# Patient Record
Sex: FEMALE | Race: WHITE | Employment: OTHER | URBAN - METROPOLITAN AREA
[De-identification: names, ages, dates, MRNs, and addresses within clinical notes are randomized per-mention and may not be internally consistent; named-entity substitution may affect disease eponyms.]

---

## 2017-01-12 ENCOUNTER — OFFICE VISIT (OUTPATIENT)
Dept: FAMILY MEDICINE CLINIC | Age: 82
End: 2017-01-12

## 2017-01-12 VITALS
DIASTOLIC BLOOD PRESSURE: 78 MMHG | HEIGHT: 66 IN | TEMPERATURE: 97.7 F | SYSTOLIC BLOOD PRESSURE: 130 MMHG | HEART RATE: 76 BPM | OXYGEN SATURATION: 98 % | BODY MASS INDEX: 27.97 KG/M2 | WEIGHT: 174 LBS

## 2017-01-12 DIAGNOSIS — I10 ESSENTIAL HYPERTENSION: ICD-10-CM

## 2017-01-12 DIAGNOSIS — E11.9 CONTROLLED TYPE 2 DIABETES MELLITUS WITHOUT COMPLICATION, WITHOUT LONG-TERM CURRENT USE OF INSULIN (HCC): Primary | ICD-10-CM

## 2017-01-12 PROCEDURE — G8420 CALC BMI NORM PARAMETERS: HCPCS | Performed by: FAMILY MEDICINE

## 2017-01-12 PROCEDURE — 1123F ACP DISCUSS/DSCN MKR DOCD: CPT | Performed by: FAMILY MEDICINE

## 2017-01-12 PROCEDURE — G8400 PT W/DXA NO RESULTS DOC: HCPCS | Performed by: FAMILY MEDICINE

## 2017-01-12 PROCEDURE — G8427 DOCREV CUR MEDS BY ELIG CLIN: HCPCS | Performed by: FAMILY MEDICINE

## 2017-01-12 PROCEDURE — 1090F PRES/ABSN URINE INCON ASSESS: CPT | Performed by: FAMILY MEDICINE

## 2017-01-12 PROCEDURE — 1036F TOBACCO NON-USER: CPT | Performed by: FAMILY MEDICINE

## 2017-01-12 PROCEDURE — 99214 OFFICE O/P EST MOD 30 MIN: CPT | Performed by: FAMILY MEDICINE

## 2017-01-12 PROCEDURE — 4040F PNEUMOC VAC/ADMIN/RCVD: CPT | Performed by: FAMILY MEDICINE

## 2017-01-12 PROCEDURE — G8484 FLU IMMUNIZE NO ADMIN: HCPCS | Performed by: FAMILY MEDICINE

## 2017-01-12 RX ORDER — TRIAMTERENE AND HYDROCHLOROTHIAZIDE 37.5; 25 MG/1; MG/1
1 TABLET ORAL DAILY
Qty: 30 TABLET | Refills: 5 | Status: ON HOLD | OUTPATIENT
Start: 2017-01-12 | End: 2021-12-27 | Stop reason: SDUPTHER

## 2017-01-12 RX ORDER — AMLODIPINE BESYLATE 5 MG/1
5 TABLET ORAL DAILY
Qty: 30 TABLET | Refills: 5 | Status: SHIPPED | OUTPATIENT
Start: 2017-01-12

## 2017-01-12 RX ORDER — METOPROLOL SUCCINATE 100 MG/1
100 TABLET, EXTENDED RELEASE ORAL DAILY
Qty: 30 TABLET | Refills: 5 | Status: SHIPPED | OUTPATIENT
Start: 2017-01-12

## 2017-01-12 ASSESSMENT — ENCOUNTER SYMPTOMS
GASTROINTESTINAL NEGATIVE: 1
RESPIRATORY NEGATIVE: 1

## 2019-03-30 ENCOUNTER — HOSPITAL ENCOUNTER (EMERGENCY)
Age: 84
Discharge: HOME | End: 2019-03-30
Payer: MEDICARE

## 2019-03-30 VITALS
OXYGEN SATURATION: 96 % | WEIGHT: 170 LBS | HEART RATE: 66 BPM | TEMPERATURE: 98.4 F | RESPIRATION RATE: 17 BRPM | SYSTOLIC BLOOD PRESSURE: 194 MMHG | BODY MASS INDEX: 27.44 KG/M2 | DIASTOLIC BLOOD PRESSURE: 65 MMHG

## 2019-03-30 DIAGNOSIS — L08.9 FINGER INFECTION: Primary | ICD-10-CM

## 2019-03-30 PROCEDURE — 99282 EMERGENCY DEPT VISIT SF MDM: CPT

## 2019-03-30 PROCEDURE — 4500000028 HC INTERMEDIATE PROCEDURE

## 2019-03-30 RX ORDER — SULFAMETHOXAZOLE AND TRIMETHOPRIM 800; 160 MG/1; MG/1
1 TABLET ORAL 2 TIMES DAILY
Qty: 14 TABLET | Refills: 0 | Status: SHIPPED | OUTPATIENT
Start: 2019-03-30 | End: 2019-04-06

## 2019-03-30 ASSESSMENT — PAIN SCALES - GENERAL: PAINLEVEL_OUTOF10: 4

## 2019-03-30 NOTE — ED PROVIDER NOTES
EMERGENCY dEPARTMENT eNCOUnter      PCP: No primary care provider on file. CHIEF COMPLAINT    Chief Complaint   Patient presents with    Wound Infection     L hand Middle finger        HPI    Melvi Morales is a 80 y.o. female who presents red swollen area to left middle finger. Onset several days. Context is patient states she has a \"cyst\" on the dorsal aspect of the distal phalanx of the left middle finger. She states recently she \"popped it\" with a needle and notes development of redness, mild swelling and pain. She is concerned that the area may be infected. No constitutional symptoms.         REVIEW OF SYSTEMS    Constitutional:  Denies fever, chills  HENT:  Denies sore throat or ear pain   Respiratory:  Denies cough or shortness of breath   Cardiovascular:  Denies chest pain, palpitations or swelling   GI:  Denies abdominal pain, nausea, vomiting, or diarrhea   Musculoskeletal:  Denies back pain   Skin:  See HPI  Neurologic:  Denies headache, focal weakness or sensory changes   Endocrine:  Denies polyuria or polydypsia   Lymphatic:  Denies swollen glands     All other review of systems are negative  See HPI and nursing notes for additional information     PAST MEDICAL HISTORY    Past Medical History:   Diagnosis Date    Diabetes type 2, controlled ([address]     Diet-controlled diabetes mellitus ([address]     Edema     Hyperlipidemia     Hypertension     PVD (peripheral vascular disease) ([address]     Sciatica     Shingles     Vitamin D deficiency        SURGICAL HISTORY    Past Surgical History:   Procedure Laterality Date   Rich Hitchcock N/A [id number]    HEMORRHOID SURGERY         CURRENT MEDICATIONS    Current Outpatient Rx   Medication Sig Dispense Refill    sulfamethoxazole-trimethoprim (BACTRIM DS) 800-160 MG per tablet Take 1 tablet by mouth 2 times daily for 7 days 14 tablet 0    metoprolol succinate (TOPROL XL) 100 MG extended release tablet Take 1 tablet by mouth daily 30 tablet 5    amLODIPine (NORVASC) 5 MG tablet Take 1 tablet by mouth daily 30 tablet 5    metFORMIN (GLUCOPHAGE) 500 MG tablet Take 1 tablet by mouth 2 times daily (with meals) 60 tablet 5    triamterene-hydrochlorothiazide (MAXZIDE-25) 37.5-25 MG per tablet Take 1 tablet by mouth daily 30 tablet 5    Cholecalciferol (VITAMIN D) 2000 UNITS CAPS capsule Take by mouth         ALLERGIES    Allergies   Allergen Reactions    Penicillins Anaphylaxis    Levaquin [Levofloxacin In D5w]     Lisinopril Swelling     Angioedema    Losartan        FAMILY HISTORY    History reviewed. No pertinent family history. SOCIAL HISTORY    Social History     Socioeconomic History    Marital status:       Spouse name: None    Number of children: None    Years of education: None    Highest education level: None   Occupational History    None   Social Needs    Financial resource strain: None    Food insecurity:     Worry: None     Inability: None    Transportation needs:     Medical: None     Non-medical: None   Tobacco Use    Smoking status: Never Smoker    Smokeless tobacco: Never Used   Substance and Sexual Activity    Alcohol use: No    Drug use: No    Sexual activity: None   Lifestyle    Physical activity:     Days per week: None     Minutes per session: None    Stress: None   Relationships    Social connections:     Talks on phone: None     Gets together: None     Attends Rastafarian service: None     Active member of club or organization: None     Attends meetings of clubs or organizations: None     Relationship status: None    Intimate partner violence:     Fear of current or ex partner: None     Emotionally abused: None     Physically abused: None     Forced sexual activity: None   Other Topics Concern    None   Social History Narrative    None       PHYSICAL EXAM    VITAL SIGNS: BP (S) (!) 194/65 Comment: pt had vitals taken after her finger was lanced without anesthetic, pt states she has documented hypertension and being treated by her PCP  Pulse 66   Temp 98.4 °F (36.9 °C) (Oral)   Resp 17   Wt 170 lb (77.1 kg)   SpO2 96%   BMI 27.44 kg/m²   Constitutional:  Well developed, well nourished, no acute distress, non-toxic appearance   HENT:  Atraumatic, Normocephalic. Respiratory:  No respiratory distress, normal breath sounds   Cardiovascular:  Normal rate, normal rhythm, peripheral pulses equal, no edema  GI:  Soft, nondistended, nontender  Musculoskeletal:  No edema, no tenderness, no deformities. Integument:    Left middle finger reveals a small red papular area over dorsal distal phalanx. There is mild-moderate tenderness to this area. No circumferential involvement. No proximal involvement or IP joint involvement. Lymphatics: Lymphatic streaks or swollen lymph nodes. Neurological: alert and oriented, no focal deficits         ________________________________________________________________________    PROCEDURES: Incision and Drainage Procedure Note    Indication: Cutaneous Abscess    Procedure:    - Procedure explained, including risks and benefits explained to the patient who expressed understanding. All questions were answered. Verbal consent obtained. - The patient was positioned appropriately and Area was prepped and draped in the usual sterile fashion using Betadine.     - Area of greatest induration was incised utilizing a #11 blade - small superficial punch incision was made expressing a small amount of blood mixed with pus. No obvious foreign body. - Wound was dressed with sterile nonstick dressing. The patient tolerated the procedure well without complication. Post procedure exam of the affected region reveals distal sensation, motor, capillary refill, and pulses intact  ________________________________________________________________________      ED COURSE & MEDICAL DECISION MAKING      Exact cause, etiology of patient's localized infection unknown.   I do not see evidence of foreign body and patient does not report history of such. We discussed the possibility of callus versus wart. Patient does note 1-2 month history of small \"cyst\" over the affected area. She recently incised the area at home with needle and notes redness developing thereafter-likely superimposed infection which was induced by home remedy of incision. While in the emergency room patient underwent I&D procedure please see procedure note above. Wound care instructions were discussed in detail with patient at time of discharge including packing removal, wound care, antibiotics, skin care, and the importance of close follow up for wound check. Patient was provided a prescription for oral antibiotic and agrees to keep appointment with dermatologist as scheduled in 2 days. Diagnosis and plan discussed in detail with patient who understands and agrees. Patient agrees to return emergency department if symptoms worsen or any new symptoms develop. Of note, patient's blood pressure elevated today. She states she has hypertension and has elevated blood pressure every time she goes to a doctor's office or hospital.  Additionally, blood pressure was measured after incision and drainage was done today-which was done without anesthesia. Patient agrees to closely monitor blood pressure and take medication as directed upon arrival home. Of note, patient declines tetanus update today-last tetanus greater than 5 years. Vital signs and nursing notes reviewed during ED course. I have independently evaluated this patient . Supervising MD present in the Emergency Department, available for consultation, throughout entirety of  patient care. Clinical  IMPRESSION    1.  Finger infection              Comment: Please note this report has been produced using speech recognition software and may contain errors related to that system including errors in grammar, punctuation, and spelling, as well as

## 2019-04-01 ENCOUNTER — HOSPITAL ENCOUNTER (OUTPATIENT)
Age: 84
Setting detail: SPECIMEN
Discharge: HOME | End: 2019-04-01
Payer: MEDICARE

## 2019-04-01 PROCEDURE — 87071 CULTURE AEROBIC QUANT OTHER: CPT

## 2019-04-01 PROCEDURE — 87073 CULTURE BACTERIA ANAEROBIC: CPT

## 2019-04-01 PROCEDURE — 87186 SC STD MICRODIL/AGAR DIL: CPT

## 2019-04-01 PROCEDURE — 87077 CULTURE AEROBIC IDENTIFY: CPT

## 2019-04-04 LAB
CULTURE: ABNORMAL
CULTURE: ABNORMAL
Lab: ABNORMAL
SPECIMEN: ABNORMAL

## 2020-09-12 ENCOUNTER — APPOINTMENT (OUTPATIENT)
Dept: GENERAL RADIOLOGY | Age: 85
End: 2020-09-12
Payer: MEDICARE

## 2020-09-12 ENCOUNTER — HOSPITAL ENCOUNTER (EMERGENCY)
Age: 85
Discharge: HOME | End: 2020-09-12
Attending: EMERGENCY MEDICINE
Payer: MEDICARE

## 2020-09-12 VITALS
OXYGEN SATURATION: 96 % | TEMPERATURE: 97.1 F | RESPIRATION RATE: 18 BRPM | HEIGHT: 66 IN | WEIGHT: 170 LBS | DIASTOLIC BLOOD PRESSURE: 83 MMHG | SYSTOLIC BLOOD PRESSURE: 193 MMHG | HEART RATE: 76 BPM | BODY MASS INDEX: 27.32 KG/M2

## 2020-09-12 LAB
ALBUMIN SERPL-MCNC: 4.2 GM/DL (ref 3.4–5)
ALP BLD-CCNC: 72 IU/L (ref 40–129)
ALT SERPL-CCNC: 7 U/L (ref 10–40)
ANION GAP SERPL CALCULATED.3IONS-SCNC: 12 MMOL/L (ref 4–16)
AST SERPL-CCNC: 14 IU/L (ref 15–37)
BACTERIA: ABNORMAL /HPF
BASOPHILS ABSOLUTE: 0.1 K/CU MM
BASOPHILS RELATIVE PERCENT: 0.7 % (ref 0–1)
BILIRUB SERPL-MCNC: 0.4 MG/DL (ref 0–1)
BILIRUBIN URINE: NEGATIVE MG/DL
BLOOD, URINE: NEGATIVE
BUN BLDV-MCNC: 14 MG/DL (ref 6–23)
CALCIUM SERPL-MCNC: 9.4 MG/DL (ref 8.3–10.6)
CAST TYPE: ABNORMAL /HPF
CHLORIDE BLD-SCNC: 99 MMOL/L (ref 99–110)
CLARITY: CLEAR
CO2: 25 MMOL/L (ref 21–32)
COLOR: YELLOW
COMMENT UA: ABNORMAL
CREAT SERPL-MCNC: 1 MG/DL (ref 0.6–1.1)
CRYSTAL TYPE: NEGATIVE /HPF
DIFFERENTIAL TYPE: ABNORMAL
EOSINOPHILS ABSOLUTE: 0.3 K/CU MM
EOSINOPHILS RELATIVE PERCENT: 4.6 % (ref 0–3)
EPITHELIAL CELLS, UA: 8 /HPF
GFR AFRICAN AMERICAN: >60 ML/MIN/1.73M2
GFR NON-AFRICAN AMERICAN: 52 ML/MIN/1.73M2
GLUCOSE BLD-MCNC: 177 MG/DL (ref 70–99)
GLUCOSE, URINE: NEGATIVE MG/DL
HCT VFR BLD CALC: 40.2 % (ref 37–47)
HEMOGLOBIN: 13.5 GM/DL (ref 12.5–16)
IMMATURE NEUTROPHIL %: 0.3 % (ref 0–0.43)
KETONES, URINE: NEGATIVE MG/DL
LEUKOCYTE ESTERASE, URINE: ABNORMAL
LYMPHOCYTES ABSOLUTE: 1.3 K/CU MM
LYMPHOCYTES RELATIVE PERCENT: 19.4 % (ref 24–44)
MAGNESIUM: 1.9 MG/DL (ref 1.8–2.4)
MCH RBC QN AUTO: 29.7 PG (ref 27–31)
MCHC RBC AUTO-ENTMCNC: 33.6 % (ref 32–36)
MCV RBC AUTO: 88.5 FL (ref 78–100)
MONOCYTES ABSOLUTE: 0.5 K/CU MM
MONOCYTES RELATIVE PERCENT: 7.7 % (ref 0–4)
NITRITE URINE, QUANTITATIVE: NEGATIVE
PDW BLD-RTO: 13 % (ref 11.7–14.9)
PH, URINE: 7 (ref 5–8)
PLATELET # BLD: 244 K/CU MM (ref 140–440)
PMV BLD AUTO: 10.9 FL (ref 7.5–11.1)
POTASSIUM SERPL-SCNC: 3.6 MMOL/L (ref 3.5–5.1)
PROTEIN UA: ABNORMAL MG/DL
RBC # BLD: 4.54 M/CU MM (ref 4.2–5.4)
RBC URINE: 0 /HPF (ref 0–6)
SEGMENTED NEUTROPHILS ABSOLUTE COUNT: 4.5 K/CU MM
SEGMENTED NEUTROPHILS RELATIVE PERCENT: 67.3 % (ref 36–66)
SODIUM BLD-SCNC: 136 MMOL/L (ref 135–145)
SPECIFIC GRAVITY UA: 1.01 (ref 1–1.03)
TOTAL IMMATURE NEUTOROPHIL: 0.02 K/CU MM
TOTAL PROTEIN: 7.5 GM/DL (ref 6.4–8.2)
TROPONIN T: <0.01 NG/ML
TSH HIGH SENSITIVITY: 4.52 UIU/ML (ref 0.27–4.2)
UROBILINOGEN, URINE: 0.2 MG/DL (ref 0.2–1)
WBC # BLD: 6.7 K/CU MM (ref 4–10.5)
WBC UA: 25 /HPF (ref 0–5)

## 2020-09-12 PROCEDURE — 84484 ASSAY OF TROPONIN QUANT: CPT

## 2020-09-12 PROCEDURE — 84443 ASSAY THYROID STIM HORMONE: CPT

## 2020-09-12 PROCEDURE — 81001 URINALYSIS AUTO W/SCOPE: CPT

## 2020-09-12 PROCEDURE — 99284 EMERGENCY DEPT VISIT MOD MDM: CPT

## 2020-09-12 PROCEDURE — 93010 ELECTROCARDIOGRAM REPORT: CPT | Performed by: INTERNAL MEDICINE

## 2020-09-12 PROCEDURE — 93005 ELECTROCARDIOGRAM TRACING: CPT | Performed by: EMERGENCY MEDICINE

## 2020-09-12 PROCEDURE — 85025 COMPLETE CBC W/AUTO DIFF WBC: CPT

## 2020-09-12 PROCEDURE — 6370000000 HC RX 637 (ALT 250 FOR IP): Performed by: EMERGENCY MEDICINE

## 2020-09-12 PROCEDURE — 71045 X-RAY EXAM CHEST 1 VIEW: CPT

## 2020-09-12 PROCEDURE — 83735 ASSAY OF MAGNESIUM: CPT

## 2020-09-12 PROCEDURE — 80053 COMPREHEN METABOLIC PANEL: CPT

## 2020-09-12 RX ORDER — CEPHALEXIN 500 MG/1
500 CAPSULE ORAL 2 TIMES DAILY
Qty: 14 CAPSULE | Refills: 0 | Status: SHIPPED | OUTPATIENT
Start: 2020-09-12 | End: 2020-09-19

## 2020-09-12 RX ORDER — CEPHALEXIN 250 MG/1
500 CAPSULE ORAL ONCE
Status: COMPLETED | OUTPATIENT
Start: 2020-09-12 | End: 2020-09-12

## 2020-09-12 RX ADMIN — CEPHALEXIN 500 MG: 250 CAPSULE ORAL at 09:12

## 2020-09-12 NOTE — ED PROVIDER NOTES
Triage Chief Complaint:   Tingling (in feet to ankles) and Shaking (reports feels shaky)      [place]:  Guzman Hernandez is a 80 y.o. female that presents to the emergency department stating she feels jittery and unable to sleep at night for the last few weeks. .  States she has noticed some tingling in her feet bilaterally. She is a diabetic. Denies any trauma. No chest pain or pressure. No shortness of breath. No fevers or chills. No exposure to any sick contacts. Does admit that she has been a little bit anxious about COVID and having to social distance and stay in her home. States her daughter lives next door and she does get to see her daily if necessary. She denies any nausea, vomiting, diarrhea. States her sugars have been in the 130s. Denies any abdominal pain. No headache. No blurry vision double vision. No numbness to her face or arms. No weakness noted. No back pain. No urinary symptoms. She has not had any episodes of dizziness, syncope or near syncope. No diaphoresis. She denies any trauma. No leg redness, swelling, bruising. No pain. Still walking normally. .    Past Medical History:   Diagnosis Date    Diabetes type 2, controlled ([address]     Diet-controlled diabetes mellitus ([address]     Edema     Hyperlipidemia     Hypertension     [facility]per[facility]) [facility]     Sciatica     Shingles     Vitamin D deficiency      Past Surgical History:   Procedure Laterality Date   Jean Carlos Poonom N/A [id number]    HEMORRHOID SURGERY       History reviewed. No pertinent family history. Social History     Socioeconomic History    Marital status:       Spouse name: Not on file    Number of children: Not on file    Years of education: Not on file    Highest education level: Not on file   Occupational History    Not on file   Social Needs    Financial resource strain: Not on file    Food insecurity     Worry: Not on file     Inability: Not on file   Angle Torres Transportation needs     Medical: Not on file     Non-medical: Not on file   Tobacco Use    Smoking status: Never Smoker    Smokeless tobacco: Never Used   Substance and Sexual Activity    Alcohol use: No    Drug use: No    Sexual activity: Not on file   Lifestyle    Physical activity     Days per week: Not on file     Minutes per session: Not on file    Stress: Not on file   Relationships    Social connections     Talks on phone: Not on file     Gets together: Not on file     Attends Sabianism service: Not on file     Active member of club or organization: Not on file     Attends meetings of clubs or organizations: Not on file     Relationship status: Not on file    Intimate partner violence     Fear of current or ex partner: Not on file     Emotionally abused: Not on file     Physically abused: Not on file     Forced sexual activity: Not on file   Other Topics Concern    Not on file   Social History Narrative    Not on file     Current Facility-Administered Medications   Medication Dose Route Frequency Provider Last Rate Last Dose    cephALEXin (KEFLEX) capsule 500 mg  500 mg Oral Once Toni Vidales MD         Current Outpatient Medications   Medication Sig Dispense Refill    cephALEXin (KEFLEX) 500 MG capsule Take 1 capsule by mouth 2 times daily for 7 days 14 capsule 0    metoprolol succinate (TOPROL XL) 100 MG extended release tablet Take 1 tablet by mouth daily 30 tablet 5    amLODIPine (NORVASC) 5 MG tablet Take 1 tablet by mouth daily 30 tablet 5    metFORMIN (GLUCOPHAGE) 500 MG tablet Take 1 tablet by mouth 2 times daily (with meals) 60 tablet 5    triamterene-hydrochlorothiazide (MAXZIDE-25) 37.5-25 MG per tablet Take 1 tablet by mouth daily 30 tablet 5    Cholecalciferol (VITAMIN D) 2000 UNITS CAPS capsule Take by mouth       Allergies   Allergen Reactions    Penicillins Anaphylaxis    Levaquin [Levofloxacin In D5w]     Lisinopril Swelling     Angioedema    Losartan      Nursing Notes Reviewed    ROS:  At least 10 systems reviewed and otherwise negative except as in the [address] Physical Exam:  ED Triage Vitals [09/12/20 0814]   Enc Vitals Group      BP (!) 193/83      Pulse 76      Resp 18      Temp 97.1 °F (36.2 °C)      Temp src       SpO2 96 %      Weight 170 lb (77.1 kg)      Height 5' 6\" (1.676 m)      Head Circumference       Peak Flow       Pain Score       Pain Loc       Pain Edu? Excl. in [address]? My pulse oximetry interpretation is which is within the normal range    GENERAL APPEARANCE: Awake and alert. Cooperative. No acute distress. HEAD: Normocephalic. Atraumatic. EYES: EOM's grossly intact. Sclera anicteric. ENT: Mucous membranes are moist. Tolerates saliva. No trismus. NECK: Supple. No meningismus. Trachea midline. HEART: RRR. Radial pulses 2+. LUNGS: Respirations unlabored. CTAB. No wheezing or stridor. Speaks in full sentences. ABDOMEN: Soft. Non-tender. No guarding or rebound. Normal bowel sounds. EXTREMITIES: No acute deformities. No leg redness, swelling, bruising. No calf tenderness. SKIN: Warm and dry. NEUROLOGICAL: No gross facial drooping. Moves all 4 extremities spontaneously. Patient is awake, alert, oriented x4. Speaks in full sentences. PSYCHIATRIC: Normal mood.     I have reviewed and interpreted all of the currently available lab results from this visit (if applicable):  Results for orders placed or performed during the hospital encounter of 09/12/20   CBC with Auto Diff   Result Value Ref Range    WBC 6.7 4.0 - 10.5 K/CU MM    RBC 4.54 4.2 - 5.4 M/CU MM    Hemoglobin 13.5 12.5 - 16.0 GM/DL    Hematocrit 40.2 37 - 47 %    MCV 88.5 78 - 100 FL    MCH 29.7 27 - 31 PG    MCHC 33.6 32.0 - 36.0 %    RDW 13.0 11.7 - 14.9 %    Platelets 401 508 - 573 K/CU MM    MPV 10.9 7.5 - 11.1 FL    Differential Type AUTOMATED DIFFERENTIAL     Segs Relative 67.3 (H) 36 - 66 %    Lymphocytes % 19.4 (L) 24 - 44 %    Monocytes % 7.7 (H) 0 - 4 %    Eosinophils % 4.6 (H) 0 - 3 %    Basophils % 0.7 0 - 1 %    Segs Absolute 4.5 K/CU MM    Lymphocytes Absolute 1.3 K/CU MM    Monocytes Absolute 0.5 K/CU MM    Eosinophils Absolute 0.3 K/CU MM    Basophils Absolute 0.1 K/CU MM    Immature Neutrophil % 0.3 0 - 0.43 %    Total Immature Neutrophil 0.02 K/CU MM   CMP   Result Value Ref Range    Sodium 136 135 - 145 MMOL/L    Potassium 3.6 3.5 - 5.1 MMOL/L    Chloride 99 99 - 110 mMol/L    CO2 25 21 - 32 MMOL/L    BUN 14 6 - 23 MG/DL    CREATININE 1.0 0.6 - 1.1 MG/DL    Glucose 177 (H) 70 - 99 MG/DL    Calcium 9.4 8.3 - 10.6 MG/DL    Alb 4.2 3.4 - 5.0 GM/DL    Total Protein 7.5 6.4 - 8.2 GM/DL    Total Bilirubin 0.4 0.0 - 1.0 MG/DL    ALT 7 (L) 10 - 40 U/L    AST 14 (L) 15 - 37 IU/L    Alkaline Phosphatase 72 40 - 129 IU/L    GFR Non- 52 (L) >60 mL/min/1.73m2    GFR African American >60 >60 mL/min/1.73m2    Anion Gap 12 4 - 16   TSH without Reflex   Result Value Ref Range    TSH, High Sensitivity 4.520 (H) 0.270 - 4.20 uIu/ml   Urinalysis with microscopic   Result Value Ref Range    Color, UA YELLOW YELLOW    Clarity, UA CLEAR CLEAR    Glucose, Urine NEGATIVE NEGATIVE MG/DL    Bilirubin Urine NEGATIVE NEGATIVE MG/DL    Ketones, Urine NEGATIVE NEGATIVE MG/DL    Specific Gravity, UA 1.010 1.001 - 1.035    Blood, Urine NEGATIVE NEGATIVE    pH, Urine 7.0 5.0 - 8.0    Protein, UA TRACE (A) NEGATIVE MG/DL    Urobilinogen, Urine 0.2 0.2 - 1.0 MG/DL    Nitrite Urine, Quantitative NEGATIVE NEGATIVE    Leukocyte Esterase, Urine MODERATE NUMBER OR AMOUNT OBSERVED (A) NEGATIVE    RBC, UA 0 0 - 6 /HPF    WBC, UA 25 (H) 0 - 5 /HPF    Epithelial Cells, UA 8 /HPF    Cast Type HYALINE CASTS (A) NO CAST FORMS SEEN /HPF    Bacteria, UA RARE (A) NEGATIVE /HPF    Crystal Type NEGATIVE NEGATIVE /HPF    Urinalysis Comments RENAL EPIS 2/HPF    Troponin   Result Value Ref Range    Troponin T <0.010 <0.01 NG/ML   Magnesium   Result Value Ref Range    Magnesium 1.9 1.8 - 2.4 mg/dl   EKG 12 Lead Result Value Ref Range    Ventricular Rate 70 BPM    Atrial Rate 70 BPM    P-R Interval 216 ms    QRS Duration 86 ms    Q-T Interval 376 ms    QTc Calculation (Bazett) 406 ms    P Axis 41 degrees    R Axis 16 degrees    T Axis 120 degrees    Diagnosis       Sinus rhythm with 1st degree AV block  T wave abnormality, consider anterolateral ischemia  Abnormal ECG  No previous ECGs available          Radiographs:  [] Radiologist's Wet Read Report Reviewed:      XR CHEST PORTABLE (Final result)   Result time 09/12/20 08:51:24   Final result by Munir Gary MD (09/12/20 08:51:24)                 Impression:     No acute cardiopulmonary process. Narrative:     EXAMINATION:   ONE XRAY VIEW OF THE CHEST     9/12/2020 8:23 am     COMPARISON:   Chest radiograph 10/06/2013     HISTORY:   ORDERING SYSTEM PROVIDED HISTORY: states feels jittery, anxious   TECHNOLOGIST PROVIDED HISTORY:   Reason for exam:->states feels jittery, anxious   Reason for Exam: states feels jittery, anxious   Acuity: Acute   Type of Exam: Initial   Relevant Medical/Surgical History: Hx HTN, Diabetes     FINDINGS:   Calcified granuloma in the left upper lobe.  Similar linear opacities in each   lung base consistent with scarring.  Otherwise clear lungs.  No definite   findings of pneumothorax or pleural effusion.  Normal mediastinal, hilar, and   cardiac contours.  Atherosclerotic calcification in the aorta.  No acute   fracture. [] Discussed with Radiologist:     [] The following radiograph was interpreted by myself in the absence of a radiologist:     EKG: (All EKG's are interpreted by myself in the absence of a cardiologist)  The Ekg interpreted by me shows  normal sinus rhythm with a rate of 70  Axis is   Normal  QTc is  normal  Intervals and Durations are unremarkable. ST Segments: depression in  v5, v6, I and aVl. T wave inversion in V2-V4   prior EKG dated 10-6-2013 has above depressions.          MDM:  Vitals stable. EKG shows no acute findings. CBC shows a normal white count of 6.7. Normal hemoglobin of 13.5. CMP normal.  Glucose 177. CO2 is normal at 29. Normal anion gap. Vimal Frye TSH is elevated at 4.520. Troponin normal. Magnesium 1.9.. Chest x-ray shows no acute findings. Urinalysis is negative for nitrites, moderate leuks, 25 WBCs with rare bacteria. Discussed results with patient. She states that she was on a medication for her glucose which she is no longer taking and was on a thyroid medication which she is no longer taking. I did discuss with patient that she should follow-up with her PCP to get a free T4, repeat TSH, repeat urine and possibly hemoglobin A1c. Return to ED if worsens. Clinical Impression:  1. Elevated TSH    2. Acute cystitis without hematuria    3. Hyperglycemia        Disposition Vitals:  [unfilled], [unfilled], [unfilled], [unfilled]    Disposition referral (if applicable):  [place] [address]  [phone]      recheck thyprid labs. repeat TSH and will need Free T4.       Disposition medications (if applicable):  New Prescriptions    CEPHALEXIN (KEFLEX) 500 MG CAPSULE    Take 1 capsule by mouth 2 times daily for 7 days         (Please note that portions of this note may have been completed with a voice recognition program. Efforts were made to edit the dictations but occasionally words are mis-transcribed.)    MD Toni Mei MD  09/12/20 0175

## 2020-09-15 LAB
EKG ATRIAL RATE: 70 BPM
EKG DIAGNOSIS: NORMAL
EKG P AXIS: 41 DEGREES
EKG P-R INTERVAL: 216 MS
EKG Q-T INTERVAL: 376 MS
EKG QRS DURATION: 86 MS
EKG QTC CALCULATION (BAZETT): 406 MS
EKG R AXIS: 16 DEGREES
EKG T AXIS: 120 DEGREES
EKG VENTRICULAR RATE: 70 BPM

## 2021-12-16 ENCOUNTER — HOSPITAL ENCOUNTER (EMERGENCY)
Age: 86
Discharge: HOME | End: 2021-12-16
Attending: EMERGENCY MEDICINE
Payer: MEDICARE

## 2021-12-16 ENCOUNTER — APPOINTMENT (OUTPATIENT)
Dept: GENERAL RADIOLOGY | Age: 86
End: 2021-12-16
Payer: MEDICARE

## 2021-12-16 VITALS
OXYGEN SATURATION: 94 % | DIASTOLIC BLOOD PRESSURE: 66 MMHG | HEIGHT: 67 IN | SYSTOLIC BLOOD PRESSURE: 146 MMHG | BODY MASS INDEX: 25.9 KG/M2 | TEMPERATURE: 97 F | WEIGHT: 165 LBS | HEART RATE: 87 BPM | RESPIRATION RATE: 16 BRPM

## 2021-12-16 DIAGNOSIS — R05.9 COUGH: Primary | ICD-10-CM

## 2021-12-16 DIAGNOSIS — J06.9 ACUTE UPPER RESPIRATORY INFECTION: ICD-10-CM

## 2021-12-16 LAB
RAPID INFLUENZA  B AGN: NEGATIVE
RAPID INFLUENZA A AGN: NEGATIVE

## 2021-12-16 PROCEDURE — U0003 INFECTIOUS AGENT DETECTION BY NUCLEIC ACID (DNA OR RNA); SEVERE ACUTE RESPIRATORY SYNDROME CORONAVIRUS 2 (SARS-COV-2) (CORONAVIRUS DISEASE [COVID-19]), AMPLIFIED PROBE TECHNIQUE, MAKING USE OF HIGH THROUGHPUT TECHNOLOGIES AS DESCRIBED BY CMS-2020-01-R: HCPCS

## 2021-12-16 PROCEDURE — U0005 INFEC AGEN DETEC AMPLI PROBE: HCPCS

## 2021-12-16 PROCEDURE — 71045 X-RAY EXAM CHEST 1 VIEW: CPT

## 2021-12-16 PROCEDURE — 99282 EMERGENCY DEPT VISIT SF MDM: CPT

## 2021-12-16 PROCEDURE — 87804 INFLUENZA ASSAY W/OPTIC: CPT

## 2021-12-16 ASSESSMENT — ENCOUNTER SYMPTOMS
RHINORRHEA: 0
BACK PAIN: 0
SORE THROAT: 0
NAUSEA: 0
COUGH: 1
ABDOMINAL PAIN: 0
EYE PAIN: 0
EYE DISCHARGE: 0
VOMITING: 0
SHORTNESS OF BREATH: 0

## 2021-12-16 NOTE — ED PROVIDER NOTES
[address] [facility] ENCOUNTER      Pt Name: Rei Anguiano  MRN: [id number]  [place] 1/20/1931  Date of evaluation: 12/16/2021  Provider: Enrique Vides MD    CHIEF COMPLAINT       Chief Complaint   Patient presents with    Cough    Nasal Congestion         HISTORY OF PRESENT ILLNESS      Rei Anguiano is a 80 y.o. female who presents to the emergency department  for   Chief Complaint   Patient presents with    Cough    Nasal Congestion       51-year-old female presents to the emergency department several days of cough and \"chest congestion. \"  She denies any fevers or chills. No abdominal symptoms. No abdominal pain. No nausea or vomiting. She does have family members who recently had some viral illnesses. She has not been vaccinated for Covid-19. She denies any remarkable difficulty breathing. She does not have a home oxygen requirement. She presents the emergency department with no signs of respiratory distress. GCS of 15. She is answering questions appropriately. Nursing Notes, Triage Notes & Vital Signs were reviewed. REVIEW OF SYSTEMS    (2-9 systems for level 4, 10 or more for level 5)     Review of Systems   Constitutional: Negative for chills and fever. HENT: Negative for congestion, rhinorrhea and sore throat. Eyes: Negative for pain and discharge. Respiratory: Positive for cough. Negative for shortness of breath. Cardiovascular: Negative for chest pain and leg swelling. Gastrointestinal: Negative for abdominal pain, nausea and vomiting. Endocrine: Negative for polydipsia and polyuria. Genitourinary: Negative for dysuria and flank pain. Musculoskeletal: Negative for back pain and neck pain. Skin: Negative for pallor and wound. Neurological: Negative for dizziness, facial asymmetry, light-headedness, numbness and headaches. Psychiatric/Behavioral: Negative for confusion.        Except as noted above the remainder of the review of systems was reviewed and negative. PAST MEDICAL HISTORY     Past Medical History:   Diagnosis Date    Diabetes type 2, controlled ([facility]     Diet-controlled [facility] mellitus ([facility]     Edema     Hyperlipidemia     Hypertension     PVD (peripheral vascular disease) ([facility]     Sciatica     Shingles     Vitamin D deficiency        Prior to Admission medications    Medication Sig Start Date End Date Taking?  Authorizing Provider   metoprolol succinate (TOPROL XL) 100 MG extended release tablet Take 1 tablet by mouth daily 1/12/17   EARLENE Padron-STALIN   amLODIPine (NORVASC) 5 MG tablet Take 1 tablet by mouth daily 1/12/17   Phani H EARLENE Guadarrama-STALIN   metFORMIN (GLUCOPHAGE) 500 MG tablet Take 1 tablet by mouth 2 times daily (with meals) 1/12/17   EARLENE Padron-C   triamterene-hydrochlorothiazide (MAXZIDE-25) 37.5-25 MG per tablet Take 1 tablet by mouth daily 1/12/17   Zaid Guadarrama PA-C   Cholecalciferol (VITAMIN D) 2000 UNITS CAPS capsule Take by mouth    Historical Provider, MD        Patient Active Problem List   Diagnosis    Calculous cholecystitis    Hypertension    Diabetes type 2, controlled ([facility]    Angioedema         SURGICAL HISTORY       Past Surgical History:   Procedure Laterality Date   Layla King N/A [id number]   [address]       Discharge Medication List as of 12/16/2021  1:59 PM      CONTINUE these medications which have NOT CHANGED    Details   metoprolol succinate (TOPROL XL) 100 MG extended release tablet Take 1 tablet by mouth daily, Disp-30 tablet, R-5      amLODIPine (NORVASC) 5 MG tablet Take 1 tablet by mouth daily, Disp-30 tablet, R-5      metFORMIN (GLUCOPHAGE) 500 MG tablet Take 1 tablet by mouth 2 times daily (with meals), Disp-60 tablet, R-5      triamterene-hydrochlorothiazide (MAXZIDE-25) 37.5-25 MG per tablet Take 1 tablet by mouth daily, Disp-30 tablet, R-5      Cholecalciferol (VITAMIN D) 2000 UNITS CAPS capsule Take by mouth             ALLERGIES     Penicillins, Levaquin [levofloxacin in d5w], Lisinopril, and Losartan    FAMILY HISTORY     History reviewed. No pertinent family history. SOCIAL HISTORY       Social History     Socioeconomic History    Marital status:      Spouse name: None    Number of children: None    Years of education: None    Highest education level: None   Occupational History    None   Tobacco Use    Smoking status: Never Smoker    Smokeless tobacco: Never Used   Substance and Sexual Activity    Alcohol use: No    Drug use: No    Sexual activity: None   Other Topics Concern    None   Social History Narrative    None     Social Determinants of Health     Financial Resource Strain:     Difficulty of Paying Living Expenses: Not on file   Food Insecurity:     Worried About Running Out of Food in the Last Year: Not on file    Sonia of Food in the Last Year: Not on file   Transportation Needs:     Lack of Transportation (Medical): Not on file    Lack of Transportation (Non-Medical):  Not on file   Physical Activity:     Days of Exercise per Week: Not on file    Minutes of Exercise per Session: Not on file   Stress:     Feeling of Stress : Not on file   Social Connections:     Frequency of Communication with Friends and Family: Not on file    Frequency of Social Gatherings with Friends and Family: Not on file    Attends Temple Services: Not on file    Active Member of Clubs or Organizations: Not on file    Attends Club or Organization Meetings: Not on file    Marital Status: Not on file   Intimate Partner Violence:     Fear of Current or Ex-Partner: Not on file    Emotionally Abused: Not on file    Physically Abused: Not on file    Sexually Abused: Not on file   Housing Stability:     Unable to Pay for Housing in the Last Year: Not on file    Number of [place] in the Last Year: Not on file    Unstable Housing in the Last Year: Not on file       SCREENINGS               PHYSICAL EXAM    (up to 7 for level 4, 8 or more for level 5)     ED Triage Vitals [12/16/21 1247]   BP Temp Temp Source Pulse Resp SpO2 Height Weight   (!) 146/66 97 °F (36.1 °C) Infrared 87 16 94 % 5' 7\" (1.702 m) 165 lb (74.8 kg)       Physical Exam  Vitals reviewed. Constitutional:       Appearance: She is not ill-appearing or toxic-appearing. HENT:      Head: Normocephalic and atraumatic. Nose: No congestion or rhinorrhea. Mouth/Throat:      Mouth: Mucous membranes are moist.      Pharynx: No oropharyngeal exudate or posterior oropharyngeal erythema. Eyes:      General:         Right eye: No discharge. Left eye: No discharge. Extraocular Movements: Extraocular movements intact. Pupils: Pupils are equal, round, and reactive to light. Cardiovascular:      Rate and Rhythm: Normal rate. Pulmonary:      Effort: Pulmonary effort is normal. No respiratory distress. Comments: B/l breath sounds  Respirations unlabored  No retractions, no increased work of breathing  Chest:      Chest wall: No tenderness. Abdominal:      Palpations: Abdomen is soft. Tenderness: There is no abdominal tenderness. There is no guarding. Musculoskeletal:         General: No tenderness. Normal range of motion. Cervical back: Normal range of motion and neck supple. No tenderness. Right lower leg: No edema. Left lower leg: No edema. Lymphadenopathy:      Cervical: No cervical adenopathy. Skin:     General: Skin is warm. Capillary Refill: Capillary refill takes less than 2 seconds. Neurological:      General: No focal deficit present. Mental Status: She is alert.          DIAGNOSTIC RESULTS     Labs Reviewed   RAPID INFLUENZA A/B ANTIGENS   COVID-19          RADIOLOGY:     Non-plain film images such as CT, Ultrasound and MRI are read by the radiologist. Plain radiographic images are visualized and preliminarily interpreted by the emergency physician. Interpretation per the Radiologist below, if available at the time of this note:    XR CHEST PORTABLE   Final Result   No acute cardiopulmonary abnormality. ED BEDSIDE ULTRASOUND:   Performed by ED Physician Liyah Bellamy MD       LABS:  Labs Reviewed   RAPID INFLUENZA A/B ANTIGENS   COVID-19       All other labs were within normal range or not returned as of this dictation. EMERGENCY DEPARTMENT COURSE and DIFFERENTIAL DIAGNOSIS/MDM:   Vitals:    Vitals:    12/16/21 1247   BP: (!) 146/66   Pulse: 87   Resp: 16   Temp: 97 °F (36.1 °C)   TempSrc: Infrared   SpO2: 94%   Weight: 165 lb (74.8 kg)   Height: 5' 7\" (1.702 m)           MDM  Number of Diagnoses or Management Options  Acute upper respiratory infection  Cough  Diagnosis management comments: 25-year-old female presents with several days of cough and \"chest congestion. \"  She denies any remarkable difficulty breathing. She does endorse sick contacts with other family medicine had recent viral illnesses. She presents with active saturations in the mid 90s on room air. No signs of respiratory distress. Blood pressure is mildly elevated. Vitals otherwise unremarkable. She has no signs of respiratory distress. She has not been vaccinated for Covid-19. We did obtain a chest x-ray that is nonacute. I also obtained a flu test which was negative. Her Covid-19 test is ordered and is pending at this time. Her respiratory status has remained stable in the emergency department. Her symptoms are consistent with a viral respiratory illness. She will monitor her oxygen saturations at home and have the results of her Covid-19 test followed by her primary care physician or have otherwise followed outpatient. She is discharged in stable condition. She is ambulatory without difficulty.        Amount and/or Complexity of Data Reviewed  Clinical lab tests: reviewed  Tests in the radiology section of CPT®: reviewed        -  Patient seen and evaluated in the emergency department. -  Triage and nursing notes reviewed and incorporated. -  Old chart records reviewed and incorporated. -  Work-up included:  See above  -  Results discussed with patient. CONSULTS:  None    PROCEDURES:  None performed unless otherwise noted below     Procedures        FINAL IMPRESSION      1. Cough    2. Acute upper respiratory infection          DISPOSITION/PLAN   DISPOSITION Decision To Discharge 12/16/2021 01:57:38 PM      PATIENT REFERRED TO:  ROSEANN Lujan   [phone]    Schedule an appointment as soon as possible for a visit in 1 week        DISCHARGE MEDICATIONS:  Discharge Medication List as of 12/16/2021  1:59 PM          ED Provider Disposition Time  DISPOSITION Decision To Discharge 12/16/2021 01:57:38 PM      Appropriate personal protective equipment was worn during the patient's evaluation. These included surgical, eye protection, surgical mask or in 95 respirator and gloves. The patient was also placed in a surgical mask for the prevention of possible spread of respiratory viral illnesses. The Patient was instructed to read the package inserts with any medication that was prescribed. Major potential reactions and medication interactions were discussed. The Patient understands that there are numerous possible adverse reactions not covered. The patient was also instructed to arrange follow-up with his or her primary care provider for review of any pending labwork or incidental findings on any radiology results that were obtained. All efforts were made to discuss any incidental findings that require further monitoring. Controlled Substances Monitoring:     No flowsheet data found.     (Please note that portions of this note were completed with a voice recognition program.  Efforts were made to edit the dictations but occasionally words are mis-transcribed.)    Sonny Vigil MD (electronically signed)  Attending Emergency Physician           Sonny Vigil MD  12/16/21 2145

## 2021-12-17 ENCOUNTER — CARE COORDINATION (OUTPATIENT)
Dept: CARE COORDINATION | Age: 86
End: 2021-12-17

## 2021-12-17 LAB
SARS-COV-2: DETECTED
SOURCE: ABNORMAL

## 2021-12-17 NOTE — CARE COORDINATION
Left message for pt requesting return call to [facility] regarding ER / Covid follow up.

## 2021-12-19 ENCOUNTER — APPOINTMENT (OUTPATIENT)
Dept: GENERAL RADIOLOGY | Age: 86
End: 2021-12-19
Payer: MEDICARE

## 2021-12-19 ENCOUNTER — HOSPITAL ENCOUNTER (EMERGENCY)
Age: 86
Discharge: HOME | End: 2021-12-19
Attending: STUDENT IN AN ORGANIZED HEALTH CARE EDUCATION/TRAINING PROGRAM
Payer: MEDICARE

## 2021-12-19 VITALS
TEMPERATURE: 98.3 F | HEART RATE: 74 BPM | DIASTOLIC BLOOD PRESSURE: 82 MMHG | RESPIRATION RATE: 20 BRPM | WEIGHT: 165 LBS | SYSTOLIC BLOOD PRESSURE: 134 MMHG | BODY MASS INDEX: 25.9 KG/M2 | HEIGHT: 67 IN | OXYGEN SATURATION: 93 %

## 2021-12-19 DIAGNOSIS — U07.1 COVID-19: Primary | ICD-10-CM

## 2021-12-19 LAB
ALBUMIN SERPL-MCNC: 3.7 GM/DL (ref 3.4–5)
ALP BLD-CCNC: 70 IU/L (ref 40–129)
ALT SERPL-CCNC: 16 U/L (ref 10–40)
ANION GAP SERPL CALCULATED.3IONS-SCNC: 12 MMOL/L (ref 4–16)
AST SERPL-CCNC: 28 IU/L (ref 15–37)
BASOPHILS ABSOLUTE: 0 K/CU MM
BASOPHILS RELATIVE PERCENT: 0.2 % (ref 0–1)
BILIRUB SERPL-MCNC: 0.4 MG/DL (ref 0–1)
BUN BLDV-MCNC: 21 MG/DL (ref 6–23)
CALCIUM SERPL-MCNC: 8.3 MG/DL (ref 8.3–10.6)
CHLORIDE BLD-SCNC: 92 MMOL/L (ref 99–110)
CO2: 22 MMOL/L (ref 21–32)
CREAT SERPL-MCNC: 1.1 MG/DL (ref 0.6–1.1)
DIFFERENTIAL TYPE: ABNORMAL
EOSINOPHILS ABSOLUTE: 0 K/CU MM
EOSINOPHILS RELATIVE PERCENT: 0 % (ref 0–3)
GFR AFRICAN AMERICAN: 56 ML/MIN/1.73M2
GFR NON-AFRICAN AMERICAN: 47 ML/MIN/1.73M2
GLUCOSE BLD-MCNC: 165 MG/DL (ref 70–99)
HCT VFR BLD CALC: 43.9 % (ref 37–47)
HEMOGLOBIN: 14.6 GM/DL (ref 12.5–16)
IMMATURE NEUTROPHIL %: 0.2 % (ref 0–0.43)
LYMPHOCYTES ABSOLUTE: 0.6 K/CU MM
LYMPHOCYTES RELATIVE PERCENT: 14.7 % (ref 24–44)
MCH RBC QN AUTO: 29.7 PG (ref 27–31)
MCHC RBC AUTO-ENTMCNC: 33.3 % (ref 32–36)
MCV RBC AUTO: 89.2 FL (ref 78–100)
MONOCYTES ABSOLUTE: 0.4 K/CU MM
MONOCYTES RELATIVE PERCENT: 10.6 % (ref 0–4)
NUCLEATED RBC %: 0 %
PDW BLD-RTO: 13.2 % (ref 11.7–14.9)
PLATELET # BLD: 129 K/CU MM (ref 140–440)
PMV BLD AUTO: 11.5 FL (ref 7.5–11.1)
POTASSIUM SERPL-SCNC: 3.9 MMOL/L (ref 3.5–5.1)
RBC # BLD: 4.92 M/CU MM (ref 4.2–5.4)
SEGMENTED NEUTROPHILS ABSOLUTE COUNT: 3 K/CU MM
SEGMENTED NEUTROPHILS RELATIVE PERCENT: 74.3 % (ref 36–66)
SODIUM BLD-SCNC: 126 MMOL/L (ref 135–145)
TOTAL IMMATURE NEUTOROPHIL: 0.01 K/CU MM
TOTAL NUCLEATED RBC: 0 K/CU MM
TOTAL PROTEIN: 6.4 GM/DL (ref 6.4–8.2)
WBC # BLD: 4.1 K/CU MM (ref 4–10.5)

## 2021-12-19 PROCEDURE — 2580000003 HC RX 258: Performed by: STUDENT IN AN ORGANIZED HEALTH CARE EDUCATION/TRAINING PROGRAM

## 2021-12-19 PROCEDURE — 80053 COMPREHEN METABOLIC PANEL: CPT

## 2021-12-19 PROCEDURE — 99283 EMERGENCY DEPT VISIT LOW MDM: CPT

## 2021-12-19 PROCEDURE — 2500000003 HC RX 250 WO HCPCS: Performed by: STUDENT IN AN ORGANIZED HEALTH CARE EDUCATION/TRAINING PROGRAM

## 2021-12-19 PROCEDURE — 85025 COMPLETE CBC W/AUTO DIFF WBC: CPT

## 2021-12-19 PROCEDURE — M0245 HC IV INFUSION BAMLANIVIMAB & ETESEVIMAB W/MONITORING: HCPCS

## 2021-12-19 PROCEDURE — 71045 X-RAY EXAM CHEST 1 VIEW: CPT

## 2021-12-19 PROCEDURE — 6360000002 HC RX W HCPCS: Performed by: STUDENT IN AN ORGANIZED HEALTH CARE EDUCATION/TRAINING PROGRAM

## 2021-12-19 PROCEDURE — 96365 THER/PROPH/DIAG IV INF INIT: CPT

## 2021-12-19 RX ORDER — SODIUM CHLORIDE 9 MG/ML
25 INJECTION, SOLUTION INTRAVENOUS PRN
Status: DISCONTINUED | OUTPATIENT
Start: 2021-12-19 | End: 2021-12-19 | Stop reason: HOSPADM

## 2021-12-19 RX ORDER — SODIUM CHLORIDE 0.9 % (FLUSH) 0.9 %
5-40 SYRINGE (ML) INJECTION PRN
Status: DISCONTINUED | OUTPATIENT
Start: 2021-12-19 | End: 2021-12-19 | Stop reason: HOSPADM

## 2021-12-19 RX ORDER — SODIUM CHLORIDE 0.9 % (FLUSH) 0.9 %
5-40 SYRINGE (ML) INJECTION EVERY 12 HOURS SCHEDULED
Status: DISCONTINUED | OUTPATIENT
Start: 2021-12-19 | End: 2021-12-19 | Stop reason: HOSPADM

## 2021-12-19 RX ADMIN — SODIUM CHLORIDE: 9 INJECTION, SOLUTION INTRAVENOUS at 15:47

## 2021-12-19 ASSESSMENT — ENCOUNTER SYMPTOMS
EYE PAIN: 0
EYE REDNESS: 0
DIARRHEA: 0
SHORTNESS OF BREATH: 0
NAUSEA: 0
CHEST TIGHTNESS: 0
ABDOMINAL DISTENTION: 0
VOMITING: 0
WHEEZING: 0
COUGH: 1
ABDOMINAL PAIN: 0
SORE THROAT: 0

## 2021-12-19 ASSESSMENT — PAIN SCALES - GENERAL: PAINLEVEL_OUTOF10: 2

## 2021-12-19 ASSESSMENT — PAIN DESCRIPTION - PAIN TYPE: TYPE: ACUTE PAIN

## 2021-12-19 NOTE — ED TRIAGE NOTES
Patient to triage with c/o generalized body aches and fatigue from COVID. Patient states symptoms started approx. 5 days ago. Patient requesting Keely Prey. Resps even and unlabored.

## 2021-12-19 NOTE — ED NOTES
Patients oxygen saturation maintained 92% while ambulating.       Janette Granados RN  12/19/21 4139

## 2021-12-20 NOTE — CARE COORDINATION
ACM call to pt regarding ER / Covid follow up. Phone answered, then call d/c. Attempted call back, no answer. No option to leave voicemail.

## 2021-12-21 NOTE — CARE COORDINATION
Final attempt. ACM call to pt regarding ER / Covid follow up. Phone sounds like it is answered then d/c. No option to leave voicemail. No further outreach to be completed.

## 2021-12-25 ENCOUNTER — HOSPITAL ENCOUNTER (OUTPATIENT)
Age: 86
Setting detail: OBSERVATION
Discharge: HOME | End: 2021-12-27
Attending: EMERGENCY MEDICINE | Admitting: INTERNAL MEDICINE
Payer: MEDICARE

## 2021-12-25 ENCOUNTER — APPOINTMENT (OUTPATIENT)
Dept: GENERAL RADIOLOGY | Age: 86
End: 2021-12-25
Payer: MEDICARE

## 2021-12-25 DIAGNOSIS — E87.1 HYPONATREMIA: ICD-10-CM

## 2021-12-25 DIAGNOSIS — I10 ESSENTIAL HYPERTENSION: ICD-10-CM

## 2021-12-25 DIAGNOSIS — U07.1 COVID-19: Primary | ICD-10-CM

## 2021-12-25 DIAGNOSIS — R06.02 SHORTNESS OF BREATH: ICD-10-CM

## 2021-12-25 LAB
ALBUMIN SERPL-MCNC: 3.6 GM/DL (ref 3.4–5)
ALP BLD-CCNC: 97 IU/L (ref 40–128)
ALT SERPL-CCNC: 39 U/L (ref 10–40)
ANION GAP SERPL CALCULATED.3IONS-SCNC: 15 MMOL/L (ref 4–16)
AST SERPL-CCNC: 41 IU/L (ref 15–37)
BASE EXCESS MIXED: 2.3 (ref 0–2.3)
BASOPHILS ABSOLUTE: 0 K/CU MM
BASOPHILS RELATIVE PERCENT: 0.2 % (ref 0–1)
BILIRUB SERPL-MCNC: 0.9 MG/DL (ref 0–1)
BUN BLDV-MCNC: 17 MG/DL (ref 6–23)
C-REACTIVE PROTEIN, HIGH SENSITIVITY: 23.5 MG/L
CALCIUM SERPL-MCNC: 8.9 MG/DL (ref 8.3–10.6)
CHLORIDE BLD-SCNC: 85 MMOL/L (ref 99–110)
CO2: 24 MMOL/L (ref 21–32)
COMMENT: ABNORMAL
CREAT SERPL-MCNC: 0.8 MG/DL (ref 0.6–1.1)
D DIMER: 269 NG/ML(DDU)
DIFFERENTIAL TYPE: ABNORMAL
DOSE AMOUNT: ABNORMAL
DOSE TIME: ABNORMAL
EKG ATRIAL RATE: 76 BPM
EKG DIAGNOSIS: NORMAL
EKG P AXIS: 43 DEGREES
EKG P-R INTERVAL: 218 MS
EKG Q-T INTERVAL: 442 MS
EKG QRS DURATION: 86 MS
EKG QTC CALCULATION (BAZETT): 497 MS
EKG R AXIS: 30 DEGREES
EKG T AXIS: 55 DEGREES
EKG VENTRICULAR RATE: 76 BPM
EOSINOPHILS ABSOLUTE: 0 K/CU MM
EOSINOPHILS RELATIVE PERCENT: 0.2 % (ref 0–3)
GFR AFRICAN AMERICAN: >60 ML/MIN/1.73M2
GFR NON-AFRICAN AMERICAN: >60 ML/MIN/1.73M2
GLUCOSE BLD-MCNC: 182 MG/DL (ref 70–99)
GLUCOSE BLD-MCNC: 188 MG/DL (ref 70–99)
GLUCOSE BLD-MCNC: 322 MG/DL (ref 70–99)
HCO3 VENOUS: 26.5 MMOL/L (ref 19–25)
HCT VFR BLD CALC: 43.4 % (ref 37–47)
HEMOGLOBIN: 15.1 GM/DL (ref 12.5–16)
IMMATURE NEUTROPHIL %: 0.5 % (ref 0–0.43)
LYMPHOCYTES ABSOLUTE: 0.8 K/CU MM
LYMPHOCYTES RELATIVE PERCENT: 13.8 % (ref 24–44)
MAGNESIUM: 1.9 MG/DL (ref 1.8–2.4)
MCH RBC QN AUTO: 29.6 PG (ref 27–31)
MCHC RBC AUTO-ENTMCNC: 34.8 % (ref 32–36)
MCV RBC AUTO: 85.1 FL (ref 78–100)
MONOCYTES ABSOLUTE: 0.5 K/CU MM
MONOCYTES RELATIVE PERCENT: 8.7 % (ref 0–4)
NUCLEATED RBC %: 0 %
O2 SAT, VEN: 48 % (ref 50–70)
OSMOLALITY URINE: 303 MOS/L (ref 292–1090)
OSMOLALITY: 264 MOS/L (ref 280–300)
PCO2, VEN: 39 MMHG (ref 38–52)
PDW BLD-RTO: 12.5 % (ref 11.7–14.9)
PH VENOUS: 7.44 (ref 7.32–7.42)
PLATELET # BLD: 219 K/CU MM (ref 140–440)
PMV BLD AUTO: 11.2 FL (ref 7.5–11.1)
PO2, VEN: 24 MMHG (ref 28–48)
POTASSIUM SERPL-SCNC: 3.5 MMOL/L (ref 3.5–5.1)
PRO-BNP: 573.2 PG/ML
RBC # BLD: 5.1 M/CU MM (ref 4.2–5.4)
SALICYLATE LEVEL: 1.2 MG/DL (ref 15–30)
SEGMENTED NEUTROPHILS ABSOLUTE COUNT: 4.2 K/CU MM
SEGMENTED NEUTROPHILS RELATIVE PERCENT: 76.6 % (ref 36–66)
SODIUM BLD-SCNC: 124 MMOL/L (ref 135–145)
T4 FREE: 1.59 NG/DL (ref 0.9–1.8)
TOTAL IMMATURE NEUTOROPHIL: 0.03 K/CU MM
TOTAL NUCLEATED RBC: 0 K/CU MM
TOTAL PROTEIN: 6.9 GM/DL (ref 6.4–8.2)
TROPONIN T: <0.01 NG/ML
TSH HIGH SENSITIVITY: 2.62 UIU/ML (ref 0.27–4.2)
WBC # BLD: 5.5 K/CU MM (ref 4–10.5)

## 2021-12-25 PROCEDURE — 85025 COMPLETE CBC W/AUTO DIFF WBC: CPT

## 2021-12-25 PROCEDURE — 96375 TX/PRO/DX INJ NEW DRUG ADDON: CPT

## 2021-12-25 PROCEDURE — 84295 ASSAY OF SERUM SODIUM: CPT

## 2021-12-25 PROCEDURE — 93010 ELECTROCARDIOGRAM REPORT: CPT | Performed by: INTERNAL MEDICINE

## 2021-12-25 PROCEDURE — 6370000000 HC RX 637 (ALT 250 FOR IP): Performed by: NURSE PRACTITIONER

## 2021-12-25 PROCEDURE — 71045 X-RAY EXAM CHEST 1 VIEW: CPT

## 2021-12-25 PROCEDURE — 84133 ASSAY OF URINE POTASSIUM: CPT

## 2021-12-25 PROCEDURE — 93005 ELECTROCARDIOGRAM TRACING: CPT | Performed by: EMERGENCY MEDICINE

## 2021-12-25 PROCEDURE — 82436 ASSAY OF URINE CHLORIDE: CPT

## 2021-12-25 PROCEDURE — 6360000002 HC RX W HCPCS: Performed by: NURSE PRACTITIONER

## 2021-12-25 PROCEDURE — 84300 ASSAY OF URINE SODIUM: CPT

## 2021-12-25 PROCEDURE — 83935 ASSAY OF URINE OSMOLALITY: CPT

## 2021-12-25 PROCEDURE — 83930 ASSAY OF BLOOD OSMOLALITY: CPT

## 2021-12-25 PROCEDURE — G0378 HOSPITAL OBSERVATION PER HR: HCPCS

## 2021-12-25 PROCEDURE — 80053 COMPREHEN METABOLIC PANEL: CPT

## 2021-12-25 PROCEDURE — 84443 ASSAY THYROID STIM HORMONE: CPT

## 2021-12-25 PROCEDURE — 96366 THER/PROPH/DIAG IV INF ADDON: CPT

## 2021-12-25 PROCEDURE — 96365 THER/PROPH/DIAG IV INF INIT: CPT

## 2021-12-25 PROCEDURE — 85379 FIBRIN DEGRADATION QUANT: CPT

## 2021-12-25 PROCEDURE — 84484 ASSAY OF TROPONIN QUANT: CPT

## 2021-12-25 PROCEDURE — 82805 BLOOD GASES W/O2 SATURATION: CPT

## 2021-12-25 PROCEDURE — 83735 ASSAY OF MAGNESIUM: CPT

## 2021-12-25 PROCEDURE — 82962 GLUCOSE BLOOD TEST: CPT

## 2021-12-25 PROCEDURE — G0480 DRUG TEST DEF 1-7 CLASSES: HCPCS

## 2021-12-25 PROCEDURE — 96374 THER/PROPH/DIAG INJ IV PUSH: CPT

## 2021-12-25 PROCEDURE — 2580000003 HC RX 258: Performed by: NURSE PRACTITIONER

## 2021-12-25 PROCEDURE — 99284 EMERGENCY DEPT VISIT MOD MDM: CPT

## 2021-12-25 PROCEDURE — 6360000002 HC RX W HCPCS: Performed by: EMERGENCY MEDICINE

## 2021-12-25 PROCEDURE — 81001 URINALYSIS AUTO W/SCOPE: CPT

## 2021-12-25 PROCEDURE — 83880 ASSAY OF NATRIURETIC PEPTIDE: CPT

## 2021-12-25 PROCEDURE — 96372 THER/PROPH/DIAG INJ SC/IM: CPT

## 2021-12-25 PROCEDURE — 86140 C-REACTIVE PROTEIN: CPT

## 2021-12-25 PROCEDURE — 84439 ASSAY OF FREE THYROXINE: CPT

## 2021-12-25 RX ORDER — AMLODIPINE BESYLATE 5 MG/1
5 TABLET ORAL DAILY
Status: DISCONTINUED | OUTPATIENT
Start: 2021-12-25 | End: 2021-12-27 | Stop reason: HOSPADM

## 2021-12-25 RX ORDER — ONDANSETRON 2 MG/ML
4 INJECTION INTRAMUSCULAR; INTRAVENOUS EVERY 6 HOURS PRN
Status: DISCONTINUED | OUTPATIENT
Start: 2021-12-25 | End: 2021-12-27 | Stop reason: HOSPADM

## 2021-12-25 RX ORDER — ACETAMINOPHEN 325 MG/1
650 TABLET ORAL EVERY 6 HOURS PRN
Status: DISCONTINUED | OUTPATIENT
Start: 2021-12-25 | End: 2021-12-27 | Stop reason: HOSPADM

## 2021-12-25 RX ORDER — SODIUM CHLORIDE 0.9 % (FLUSH) 0.9 %
5-40 SYRINGE (ML) INJECTION EVERY 12 HOURS SCHEDULED
Status: DISCONTINUED | OUTPATIENT
Start: 2021-12-25 | End: 2021-12-27 | Stop reason: HOSPADM

## 2021-12-25 RX ORDER — ACETAMINOPHEN 650 MG/1
650 SUPPOSITORY RECTAL EVERY 6 HOURS PRN
Status: DISCONTINUED | OUTPATIENT
Start: 2021-12-25 | End: 2021-12-27 | Stop reason: HOSPADM

## 2021-12-25 RX ORDER — SODIUM CHLORIDE AND POTASSIUM CHLORIDE .9; .15 G/100ML; G/100ML
SOLUTION INTRAVENOUS CONTINUOUS
Status: DISPENSED | OUTPATIENT
Start: 2021-12-25 | End: 2021-12-25

## 2021-12-25 RX ORDER — POLYETHYLENE GLYCOL 3350 17 G/17G
17 POWDER, FOR SOLUTION ORAL DAILY PRN
Status: DISCONTINUED | OUTPATIENT
Start: 2021-12-25 | End: 2021-12-27 | Stop reason: HOSPADM

## 2021-12-25 RX ORDER — DEXTROSE MONOHYDRATE 25 G/50ML
12.5 INJECTION, SOLUTION INTRAVENOUS PRN
Status: DISCONTINUED | OUTPATIENT
Start: 2021-12-25 | End: 2021-12-27 | Stop reason: HOSPADM

## 2021-12-25 RX ORDER — NICOTINE POLACRILEX 4 MG
15 LOZENGE BUCCAL PRN
Status: DISCONTINUED | OUTPATIENT
Start: 2021-12-25 | End: 2021-12-27 | Stop reason: HOSPADM

## 2021-12-25 RX ORDER — SODIUM CHLORIDE 1000 MG
1 TABLET, SOLUBLE MISCELLANEOUS 2 TIMES DAILY WITH MEALS
Status: DISCONTINUED | OUTPATIENT
Start: 2021-12-25 | End: 2021-12-26

## 2021-12-25 RX ORDER — DEXAMETHASONE SODIUM PHOSPHATE 10 MG/ML
6 INJECTION, SOLUTION INTRAMUSCULAR; INTRAVENOUS EVERY 24 HOURS
Status: DISCONTINUED | OUTPATIENT
Start: 2021-12-26 | End: 2021-12-27 | Stop reason: HOSPADM

## 2021-12-25 RX ORDER — METOPROLOL SUCCINATE 50 MG/1
100 TABLET, EXTENDED RELEASE ORAL DAILY
Status: DISCONTINUED | OUTPATIENT
Start: 2021-12-25 | End: 2021-12-27 | Stop reason: HOSPADM

## 2021-12-25 RX ORDER — SODIUM CHLORIDE 0.9 % (FLUSH) 0.9 %
10 SYRINGE (ML) INJECTION PRN
Status: DISCONTINUED | OUTPATIENT
Start: 2021-12-25 | End: 2021-12-27 | Stop reason: HOSPADM

## 2021-12-25 RX ORDER — SODIUM CHLORIDE 9 MG/ML
INJECTION, SOLUTION INTRAVENOUS CONTINUOUS
Status: DISCONTINUED | OUTPATIENT
Start: 2021-12-25 | End: 2021-12-25

## 2021-12-25 RX ORDER — SODIUM CHLORIDE 9 MG/ML
25 INJECTION, SOLUTION INTRAVENOUS PRN
Status: DISCONTINUED | OUTPATIENT
Start: 2021-12-25 | End: 2021-12-27 | Stop reason: HOSPADM

## 2021-12-25 RX ORDER — DEXAMETHASONE SODIUM PHOSPHATE 10 MG/ML
10 INJECTION, SOLUTION INTRAMUSCULAR; INTRAVENOUS ONCE
Status: COMPLETED | OUTPATIENT
Start: 2021-12-25 | End: 2021-12-25

## 2021-12-25 RX ORDER — POLYETHYLENE GLYCOL 3350 17 G
2 POWDER IN PACKET (EA) ORAL
Status: DISCONTINUED | OUTPATIENT
Start: 2021-12-25 | End: 2021-12-27 | Stop reason: HOSPADM

## 2021-12-25 RX ORDER — SODIUM CHLORIDE 1000 MG
1 TABLET, SOLUBLE MISCELLANEOUS ONCE
Status: DISCONTINUED | OUTPATIENT
Start: 2021-12-25 | End: 2021-12-25

## 2021-12-25 RX ORDER — ONDANSETRON 4 MG/1
4 TABLET, ORALLY DISINTEGRATING ORAL EVERY 8 HOURS PRN
Status: DISCONTINUED | OUTPATIENT
Start: 2021-12-25 | End: 2021-12-27 | Stop reason: HOSPADM

## 2021-12-25 RX ORDER — DEXTROSE MONOHYDRATE 50 MG/ML
100 INJECTION, SOLUTION INTRAVENOUS PRN
Status: DISCONTINUED | OUTPATIENT
Start: 2021-12-25 | End: 2021-12-27 | Stop reason: HOSPADM

## 2021-12-25 RX ADMIN — Medication 1 G: at 15:10

## 2021-12-25 RX ADMIN — DEXAMETHASONE SODIUM PHOSPHATE 10 MG: 10 INJECTION, SOLUTION INTRAMUSCULAR; INTRAVENOUS at 08:27

## 2021-12-25 RX ADMIN — POTASSIUM CHLORIDE AND SODIUM CHLORIDE: 900; 150 INJECTION, SOLUTION INTRAVENOUS at 12:33

## 2021-12-25 RX ADMIN — AMLODIPINE BESYLATE 5 MG: 5 TABLET ORAL at 12:30

## 2021-12-25 RX ADMIN — ENOXAPARIN SODIUM 40 MG: 100 INJECTION SUBCUTANEOUS at 12:36

## 2021-12-25 RX ADMIN — SODIUM CHLORIDE, PRESERVATIVE FREE 10 ML: 5 INJECTION INTRAVENOUS at 19:51

## 2021-12-25 RX ADMIN — Medication 15 G: at 15:10

## 2021-12-25 ASSESSMENT — ENCOUNTER SYMPTOMS
COUGH: 1
SORE THROAT: 0
VOMITING: 0
NAUSEA: 0
ABDOMINAL PAIN: 0
BACK PAIN: 0
RHINORRHEA: 0
EYE REDNESS: 0
SHORTNESS OF BREATH: 1

## 2021-12-25 NOTE — CONSULTS
Nephrology Service Consultation    Patient:  Lesa Horn  MRN: [id number]  Consulting physician:  Nato Mcmullen MD  Reason for Consult: hyponatremia    History Obtained From:  patient, electronic medical record  PCP: Karolyn Madrid PA-C    HISTORY OF PRESENT ILLNESS:   The patient is a 80 y.o. female who presents with weakness and sob from home on diuretic with htn and not had vaccine and lives alone. Present with body ache and sob not better noted covid 12/16/21 and admit with low na and renal asked to see. Pt with Dm2 and htn with pvd and not able to give more details. And info as above    Past Medical History:        Diagnosis Date    Diabetes type 2, controlled ([address]     Diet-controlled diabetes mellitus ([address]     Edema     Hyperlipidemia     Hypertension     [facility]per[facility] disease) [facility]     Sciatica     Shingles     Vitamin D deficiency        Past Surgical History:        Procedure Laterality Date   Jose Angel Mchugho N/A [id number]    HEMORRHOID SURGERY         Medications:   Scheduled Meds:   urea  15 g Oral Daily    sodium chloride  1 g Oral BID WC     Continuous Infusions:   0.9% NaCl with KCl 20 mEq       PRN Meds:. Allergies:  Penicillins, Levaquin [levofloxacin in d5w], Lisinopril, and Losartan    Social History:   TOBACCO:   reports that she has never smoked. She has never used smokeless tobacco.  ETOH:   reports no history of alcohol use. OCCUPATION:      Family History:   History reviewed. No pertinent family history. REVIEW OF SYSTEMS:  Negative except for weak body ache. Physical Exam:    I/O: No intake/output data recorded. Vitals: BP (!) 146/88   Pulse 76   Temp 97.6 °F (36.4 °C) (Oral)   Resp 20   SpO2 96%   General appearance: awake weak  HEENT: Head: Normal, normocephalic, atraumatic.   Neck: supple, symmetrical, trachea midline  Lungs: diminished breath sounds bilaterally  Heart: S1, S2 normal  Abdomen: abnormal findings:  soft NT  Extremities: edema trace  Neurologic: Mental status: alertness:awake sob      CBC:   Recent Labs     12/25/21  0740   WBC 5.5   HGB 15.1        BMP:    Recent Labs     12/25/21  0740   *   K 3.5   CL 85*   CO2 24   BUN 17   CREATININE 0.8   GLUCOSE 188*     Hepatic:   Recent Labs     12/25/21  0740   AST 41*   ALT 39   BILITOT 0.9   ALKPHOS 97     Troponin: No results for input(s): TROPONINI in the last 72 hours. BNP: No results for input(s): BNP in the last 72 hours. Lipids: No results for input(s): CHOL, HDL in the last 72 hours. Invalid input(s): LDLCALCU  ABGs: No results found for: PHART, PO2ART, XFR3UZF  INR: No results for input(s): INR in the last 72 hours.   Renal Labs  Albumin:    Lab Results   Component Value Date    LABALBU 3.6 12/25/2021     Calcium:    Lab Results   Component Value Date    CALCIUM 8.9 12/25/2021     Phosphorus:  No results found for: PHOS  U/A:    Lab Results   Component Value Date    NITRU NEGATIVE 09/12/2020    COLORU YELLOW 09/12/2020    WBCUA 25 09/12/2020    RBCUA 0 09/12/2020    MUCUS NEGATIVE 10/07/2013    BACTERIA RARE 09/12/2020    CLARITYU CLEAR 09/12/2020    SPECGRAV 1.010 09/12/2020    UROBILINOGEN 0.2 09/12/2020    BILIRUBINUR NEGATIVE 09/12/2020    BLOODU NEGATIVE 09/12/2020    KETUA NEGATIVE 09/12/2020     ABG:  No results found for: PHART, PUQ9YJY, PO2ART, MBK8RHL, BEART, THGBART, ZPR8LDQ, M5XZZFSU  HgBA1c:    Lab Results   Component Value Date    LABA1C 6.7 08/17/2016     Microalbumen/Creatinine ratio:  No components found for: RUCREAT  TSH:  No results found for: TSH  IRON:  No results found for: IRON  Iron Saturation:  No components found for: PERCENTFE  TIBC:  No results found for: TIBC  FERRITIN:  No results found for: FERRITIN  RPR:  No results found for: RPR  COURTNEY:  No results found for: ANATITER, COURTNEY  24 Hour Urine for Creatinine Clearance:  No components found for: Judge Allison UTV10  -----------------------------------------------------------------      Assessment and Recommendations     Patient Active Problem List   Diagnosis Code    Calculous cholecystitis K80.10    Hypertension I10    Diabetes type 2, controlled ([facility] E11.9    Angioedema T78. 3XXA     Imp/plan  1 covid 19 +  2 hyponatremia  3 htn  4 Dm2  5 weak with sob    Plan  1 admit covid unit and treat with protocol  2 na low and not want give too much ivf.  Stop in 4 hr and monitor, start nacl tab and urea tab  3 bp stable monitor for now  4 ssi  5 monitor with above therapy and follow    Electronically signed by Damon Phillips MD on 12/25/2021 at 10:35 AM

## 2021-12-25 NOTE — H&P
History and Physical      Name:  Bandar Lopez /Age/Sex: 1931  (80 y.o. female)   MRN & CSN:  [id number] & [id number] Admission Date/Time: 2021  7:00 AM   Location:  ED19/ED-19 PCP: Lorenzo Gutierrez [facility] Day: 1    Assessment and Plan:   Bandar Lopze is a 80 y.o.  female with history of hypertension presents with fatigue and shortness of breath. Patient was found to Covid positive. COVID-19 virus infection  Acute respiratory failure  -Currently on 2 L NC  -Admit to observation unit  -Start Decadron  -Albuterol, supportive care    Hyponatremia  -Sodium level 124  -Nephrology consulted: Suggest sodium tablet for now  -Repeat BMP in a.m. Hypertension  -Continue home medication metoprolol and amlodipine  -Hold Maxzide due to hyponatremia    Diabetes  -Hold Metformin  -Insulin sliding scale    DVT prophylaxis  -Lovenox 40 mg daily    Diet No diet orders on file   DVT Prophylaxis [x] Lovenox, []  Heparin, [] SCDs, [] Ambulation   GI Prophylaxis [] PPI,  [] H2 Blocker,  [] Carafate,  [] Diet/Tube Feeds   Code Status Prior   Disposition Patient requires continued admission due to medical condition     History of Present Illness:     Chief Complaint: Fatigue    Bandar Lopez is a 80 y.o.  female with a history of hypertension and diabetes who presents with fatigue and shortness of breath. Patient stated she has been sick for a month with poor appetite and fatigue. Patient was diagnosed over Covid 19 on . She received monoclonal antibody a week ago. Patient reported shortness of breath yesterday. Patient was sent to ED today. EMS noticed the patient's O2 sat dropped to 89% on room air. Her chest x-ray showed development of bilateral lung infiltration. She was found sodium level 124. Patient will be admitted to observation for Covid treatment and hyponatremia.        Ten point ROS reviewed negative, unless as noted above    Objective:   No intake or output data in the 24 hours ending 12/25/21 1051   Vitals:   Vitals:    12/25/21 0934   BP:    Pulse:    Resp:    Temp: 97.6 °F (36.4 °C)   SpO2:      Physical Exam:   GEN Awake female, laying on bed in no apparent distress. Appears given age. EYES Pupils are equally round. No scleral erythema, discharge, or conjunctivitis. HENT Mucous membranes are moist. Oral pharynx without exudates, no evidence of thrush. NECK Supple, no apparent thyromegaly or masses. RESP bilateral lungs are clear  CARDIO/VASC S1/S2 auscultated. Regular rate without appreciable murmurs, rubs, or gallops. No JVD or carotid bruits. Peripheral pulses equal bilaterally and palpable. No peripheral edema. GI Abdomen is soft without significant tenderness, masses, or guarding. Bowel sounds are normoactive. Rectal exam deferred.  No costovertebral angle tenderness. Normal appearing external genitalia. Marie catheter is not present. HEME/LYMPH No palpable cervical lymphadenopathy and no hepatosplenomegaly. No petechiae or ecchymoses. MSK No gross joint deformities. SKIN Normal coloration, warm, dry. NEURO Cranial nerves appear grossly intact, normal speech, no lateralizing weakness. PSYCH Awake, alert, oriented x 4. Affect appropriate. Past Medical History:      Past Medical History:   Diagnosis Date    Diabetes type 2, controlled ([address]     Diet-controlled diabetes mellitus ([address]     Edema     Hyperlipidemia     Hypertension     PVD (peripheral vascular disease) ([address]     Sciatica     Shingles     Vitamin D deficiency      PSHX:  has a past surgical history that includes Hemorrhoid surgery and Cholecystectomy, laparoscopic (N/A, [id number]). Allergies: Allergies   Allergen Reactions    Penicillins Anaphylaxis    Levaquin [Levofloxacin In D5w]     Lisinopril Swelling     Angioedema    Losartan        FAM HX: family history is not on file. Soc HX:   Social History     Socioeconomic History    Marital status:       Spouse

## 2021-12-25 NOTE — ED PROVIDER NOTES
Triage Chief Complaint:   Positive For Covid-19 (89% upon EMS arrival; 97 on 3L), Shortness of Breath, and Generalized Body Aches    [place]:  Lesa Horn is a 80 y.o. female that presents with increasing shortness of breath today generalized body aches. She was diagnosed with COVID-19 on 12/14 and did receive monoclonal antibodies about a week ago. Patient was not vaccinated. She was found to be 89% on room air by EMS upon arrival so she was put on 2 L of oxygen via nasal cannula. She denies any nausea, vomiting or fevers. She states she has a minimal cough. She states she has been a little bit short of breath her shortness of breath was significantly worse today. She states she had a little bit of a black bowel movement on Tuesday and a black bowel movement on Wednesday but since then she has had a bowel movement that was normal.  She denies any abdominal pain. She states she has been taking aspirin for symptoms and taking it 2 or 3 times a day she thinks but she is also unsure how much aspirin she has taken. She denies any lower extremity swelling or history of blood clots. ROS:   Review of Systems   Constitutional: Positive for fatigue. Negative for chills and fever. HENT: Negative for congestion, rhinorrhea and sore throat. Eyes: Negative for redness and visual disturbance. Respiratory: Positive for cough (mild) and shortness of breath. Cardiovascular: Negative for chest pain and leg swelling. Gastrointestinal: Negative for abdominal pain, nausea and vomiting. Black stools a few days ago   Genitourinary: Negative for dysuria and frequency. Musculoskeletal: Positive for myalgias (generalized). Negative for arthralgias and back pain. Skin: Negative for rash and wound. Neurological: Negative for syncope and headaches. Psychiatric/Behavioral: Negative. Negative for hallucinations and suicidal ideas.        Past Medical History:   Diagnosis Date    Diabetes type 2, controlled ([facility]     Diet-controlled diabetes mellitus ([facility]     Edema     Hyperlipidemia     Hypertension     [facility]     Sciatica     Shingles     Vitamin D deficiency      Past Surgical History:   Procedure Laterality Date   Najma Noonan N/A [id number]    HEMORRHOID SURGERY       History reviewed. No pertinent family history. Social History     Socioeconomic History    Marital status:      Spouse name: Not on file    Number of children: Not on file    Years of education: Not on file    Highest education level: Not on file   Occupational History    Not on file   Tobacco Use    Smoking status: Never Smoker    Smokeless tobacco: Never Used   Substance and Sexual Activity    Alcohol use: No    Drug use: No    Sexual activity: Not on file   Other Topics Concern    Not on file   Social History Narrative    Not on file     Social Determinants of Health     Financial Resource Strain:     Difficulty of Paying Living Expenses: Not on file   Food Insecurity:     Worried About Running Out of Food in the Last Year: Not on file    Sonia of Food in the Last Year: Not on file   Transportation Needs:     Lack of Transportation (Medical): Not on file    Lack of Transportation (Non-Medical):  Not on file   Physical Activity:     Days of Exercise per Week: Not on file    Minutes of Exercise per Session: Not on file   Stress:     Feeling of Stress : Not on file   Social Connections:     Frequency of Communication with Friends and Family: Not on file    Frequency of Social Gatherings with Friends and Family: Not on file    Attends Yazidism Services: Not on file    Active Member of Clubs or Organizations: Not on file    Attends Club or Organization Meetings: Not on file    Marital Status: Not on file   Intimate Partner Violence:     Fear of Current or Ex-Partner: Not on file    Emotionally Abused: Not on file    Physically Abused: Not on file    Sexually Abused: Not on file   Housing Stability:     Unable to Pay for Housing in the Last Year: Not on file    Number of Places Lived in the Last Year: Not on file    Unstable Housing in the Last Year: Not on file     Current Facility-Administered Medications   Medication Dose Route Frequency Provider Last Rate Last Admin    0.9% NaCl with KCl 20 mEq infusion   IntraVENous Continuous Eliazar Yanes MD        urea (URE-NA) packet 15 g  15 g Oral Daily Eliazar Yanes MD        sodium chloride tablet 1 g  1 g Oral BID  Eliazar Jauregui MD         Current Outpatient Medications   Medication Sig Dispense Refill    metoprolol succinate (TOPROL XL) 100 MG extended release tablet Take 1 tablet by mouth daily 30 tablet 5    amLODIPine (NORVASC) 5 MG tablet Take 1 tablet by mouth daily 30 tablet 5    metFORMIN (GLUCOPHAGE) 500 MG tablet Take 1 tablet by mouth 2 times daily (with meals) 60 tablet 5    triamterene-hydrochlorothiazide (MAXZIDE-25) 37.5-25 MG per tablet Take 1 tablet by mouth daily 30 tablet 5    Cholecalciferol (VITAMIN D) 2000 UNITS CAPS capsule Take by mouth       Allergies   Allergen Reactions    Penicillins Anaphylaxis    Levaquin [Levofloxacin In D5w]     Lisinopril Swelling     Angioedema    Losartan        Nursing Notes Reviewed     Physical Exam:   ED Triage Vitals [12/25/21 0707]   Enc Vitals Group      BP (!) 146/88      Pulse 76      Resp 20      Temp       Temp src       SpO2 96 %      Weight       Height       Head Circumference       Peak Flow       Pain Score       Pain Loc       Pain Edu? Excl. in [address]? BP (!) 146/88   Pulse 76   Temp 97.6 °F (36.4 °C) (Oral)   Resp 20   SpO2 96%   My pulse ox interpretation is - abnormal  Physical Exam  Vitals and nursing note reviewed. Constitutional:       General: She is not in acute distress. Appearance: She is ill-appearing. She is not diaphoretic. Comments: Early appearing. Appears to feel unwell. HENT:      Head: Normocephalic and atraumatic. Eyes:      General:         Right eye: No discharge. Left eye: No discharge. Conjunctiva/sclera: Conjunctivae normal.   Cardiovascular:      Rate and Rhythm: Normal rate and regular rhythm. Pulses: Normal pulses. Radial pulses are 2+ on the right side and 2+ on the left side. Pulmonary:      Effort: Tachypnea present. No respiratory distress. Breath sounds: Examination of the right-lower field reveals decreased breath sounds. Examination of the left-lower field reveals decreased breath sounds. Decreased breath sounds present. No wheezing or rales. Abdominal:      General: There is no distension. Tenderness: There is no abdominal tenderness. There is no guarding or rebound. Musculoskeletal:         General: No swelling or tenderness. Normal range of motion. Right lower leg: No edema. Left lower leg: No edema. Skin:     General: Skin is warm and dry. Neurological:      General: No focal deficit present. Mental Status: She is alert. Cranial Nerves: No cranial nerve deficit.    Psychiatric:         Mood and Affect: Mood normal.         Behavior: Behavior normal.         I have reviewed and interpreted all of the currently available lab results from this visit (if applicable):  Results for orders placed or performed during the hospital encounter of 12/25/21   EKG 12 Lead   Result Value Ref Range    Ventricular Rate 76 BPM    Atrial Rate 76 BPM    P-R Interval 218 ms    QRS Duration 86 ms    Q-T Interval 442 ms    QTc Calculation (Bazett) 497 ms    P Axis 43 degrees    R Axis 30 degrees    T Axis 55 degrees    Diagnosis        Poor data quality, interpretation may be adversely affected  Sinus rhythm with 1st degree AV block with premature supraventricular complexes  T wave abnormality, consider lateral ischemia  Abnormal ECG  When compared with ECG of 12-SEP-2020 08:23,  premature supraventricular complexes are now present  Nonspecific T wave abnormality, worse in Inferior leads  QT has lengthened        Radiographs (if obtained):  [] The following radiograph was interpreted by myself in the absence of a radiologist:  [x]Radiologist's Report Reviewed:  XR CHEST PORTABLE   Final Result   Interval development of bilateral lung infiltrates worrisome for COVID 19   pneumonia superimposed on COPD. XR CHEST PORTABLE    (Results Pending)         EKG (if obtained): (All EKG's are interpreted by myself in the absence of a cardiologist)  Sinus rhythm with a first-degree AV block. Rate of 76, NJ interval 218, QRS 86, QTc 497. No ST elevations or depressions. Nonspecific T waves. Impression: Abnormal EKG. When compared to previous EKG from 9/12/2020, there are no significant changes. MDM:  Differential diagnoses considered include but are not limited to COVID-19, Covid pneumonia, pneumonia, superinfection, pulmonary edema, COPD exacerbation, pulmonary embolism, aspirin overdose. Basic labs were obtained and show hyponatremia but otherwise unremarkable. Normal hemoglobin level. 30 on gentle IV fluids for the hyponatremia. Chest x-ray shows development of bilateral lung infiltrates worrisome for COVID-19 pneumonia. Patient was able to be weaned off the oxygen and is maintaining oxygen saturations in the very low 90s on room air. Event or significant hyponatremia she still needs admission to the hospital.    I spoke with Refugio Huerta, MINOR for hospitalist service, who graciously agreed to admit the patient. Plan of care explained to patient. All questions and concerns were addressed to the patient's satisfaction. Patient understood and agreed with plan. I did don appropriate PPE (including face mask, protective eye ware/safety glasses and gloves), as recommended by the health facility/national standard best practice, during my bedside interactions with the patient. Clinical Impression:  1.  COVID-19 2. Shortness of breath    3. Hyponatremia          Bev Hill MD       Please note that portions of this note may have been complete with a voice recognition program.  Effortswere made to edit the dictations, but occasional words are mis-transcribed.          Bev Hill MD  12/25/21 6124

## 2021-12-26 ENCOUNTER — APPOINTMENT (OUTPATIENT)
Dept: GENERAL RADIOLOGY | Age: 86
End: 2021-12-26
Payer: MEDICARE

## 2021-12-26 LAB
ALBUMIN SERPL-MCNC: 3.6 GM/DL (ref 3.4–5)
ANION GAP SERPL CALCULATED.3IONS-SCNC: 11 MMOL/L (ref 4–16)
BUN BLDV-MCNC: 26 MG/DL (ref 6–23)
C-REACTIVE PROTEIN, HIGH SENSITIVITY: 29.3 MG/L
CALCIUM SERPL-MCNC: 8.8 MG/DL (ref 8.3–10.6)
CHLORIDE BLD-SCNC: 94 MMOL/L (ref 99–110)
CO2: 26 MMOL/L (ref 21–32)
CREAT SERPL-MCNC: 0.8 MG/DL (ref 0.6–1.1)
GFR AFRICAN AMERICAN: >60 ML/MIN/1.73M2
GFR NON-AFRICAN AMERICAN: >60 ML/MIN/1.73M2
GLUCOSE BLD-MCNC: 192 MG/DL (ref 70–99)
GLUCOSE BLD-MCNC: 196 MG/DL (ref 70–99)
GLUCOSE BLD-MCNC: 200 MG/DL (ref 70–99)
GLUCOSE BLD-MCNC: 204 MG/DL (ref 70–99)
GLUCOSE BLD-MCNC: 221 MG/DL (ref 70–99)
HCT VFR BLD CALC: 42.2 % (ref 37–47)
HEMOGLOBIN: 14.6 GM/DL (ref 12.5–16)
MCH RBC QN AUTO: 29.5 PG (ref 27–31)
MCHC RBC AUTO-ENTMCNC: 34.6 % (ref 32–36)
MCV RBC AUTO: 85.3 FL (ref 78–100)
PDW BLD-RTO: 12.6 % (ref 11.7–14.9)
PHOSPHORUS: 2.7 MG/DL (ref 2.5–4.9)
PLATELET # BLD: 250 K/CU MM (ref 140–440)
PMV BLD AUTO: 10.8 FL (ref 7.5–11.1)
POTASSIUM SERPL-SCNC: 4.1 MMOL/L (ref 3.5–5.1)
RBC # BLD: 4.95 M/CU MM (ref 4.2–5.4)
SODIUM BLD-SCNC: 127 MMOL/L (ref 135–145)
SODIUM BLD-SCNC: 127 MMOL/L (ref 135–145)
SODIUM BLD-SCNC: 129 MMOL/L (ref 135–145)
SODIUM BLD-SCNC: 130 MMOL/L (ref 135–145)
SODIUM BLD-SCNC: 131 MMOL/L (ref 135–145)
WBC # BLD: 4.8 K/CU MM (ref 4–10.5)

## 2021-12-26 PROCEDURE — 6360000002 HC RX W HCPCS: Performed by: NURSE PRACTITIONER

## 2021-12-26 PROCEDURE — 80048 BASIC METABOLIC PNL TOTAL CA: CPT

## 2021-12-26 PROCEDURE — 2700000000 HC OXYGEN THERAPY PER DAY

## 2021-12-26 PROCEDURE — 96372 THER/PROPH/DIAG INJ SC/IM: CPT

## 2021-12-26 PROCEDURE — 82962 GLUCOSE BLOOD TEST: CPT

## 2021-12-26 PROCEDURE — 96366 THER/PROPH/DIAG IV INF ADDON: CPT

## 2021-12-26 PROCEDURE — G0378 HOSPITAL OBSERVATION PER HR: HCPCS

## 2021-12-26 PROCEDURE — 36415 COLL VENOUS BLD VENIPUNCTURE: CPT

## 2021-12-26 PROCEDURE — 86140 C-REACTIVE PROTEIN: CPT

## 2021-12-26 PROCEDURE — 71045 X-RAY EXAM CHEST 1 VIEW: CPT

## 2021-12-26 PROCEDURE — 6370000000 HC RX 637 (ALT 250 FOR IP): Performed by: INTERNAL MEDICINE

## 2021-12-26 PROCEDURE — 96376 TX/PRO/DX INJ SAME DRUG ADON: CPT

## 2021-12-26 PROCEDURE — 2580000003 HC RX 258: Performed by: NURSE PRACTITIONER

## 2021-12-26 PROCEDURE — 84295 ASSAY OF SERUM SODIUM: CPT

## 2021-12-26 PROCEDURE — 85027 COMPLETE CBC AUTOMATED: CPT

## 2021-12-26 PROCEDURE — 80069 RENAL FUNCTION PANEL: CPT

## 2021-12-26 PROCEDURE — 6370000000 HC RX 637 (ALT 250 FOR IP): Performed by: NURSE PRACTITIONER

## 2021-12-26 RX ORDER — SODIUM CHLORIDE 1000 MG
1 TABLET, SOLUBLE MISCELLANEOUS
Status: DISCONTINUED | OUTPATIENT
Start: 2021-12-26 | End: 2021-12-27 | Stop reason: HOSPADM

## 2021-12-26 RX ORDER — CLONIDINE HYDROCHLORIDE 0.1 MG/1
0.1 TABLET ORAL 2 TIMES DAILY
Status: DISCONTINUED | OUTPATIENT
Start: 2021-12-26 | End: 2021-12-27 | Stop reason: HOSPADM

## 2021-12-26 RX ADMIN — Medication 1 G: at 09:23

## 2021-12-26 RX ADMIN — SODIUM CHLORIDE, PRESERVATIVE FREE 10 ML: 5 INJECTION INTRAVENOUS at 09:23

## 2021-12-26 RX ADMIN — Medication 15 G: at 09:22

## 2021-12-26 RX ADMIN — INSULIN LISPRO 1 UNITS: 100 INJECTION, SOLUTION INTRAVENOUS; SUBCUTANEOUS at 21:34

## 2021-12-26 RX ADMIN — CLONIDINE HYDROCHLORIDE 0.1 MG: 0.1 TABLET ORAL at 14:39

## 2021-12-26 RX ADMIN — AMLODIPINE BESYLATE 5 MG: 5 TABLET ORAL at 09:23

## 2021-12-26 RX ADMIN — Medication 1 G: at 18:08

## 2021-12-26 RX ADMIN — SODIUM CHLORIDE, PRESERVATIVE FREE 10 ML: 5 INJECTION INTRAVENOUS at 21:34

## 2021-12-26 RX ADMIN — CLONIDINE HYDROCHLORIDE 0.1 MG: 0.1 TABLET ORAL at 21:34

## 2021-12-26 RX ADMIN — ENOXAPARIN SODIUM 40 MG: 100 INJECTION SUBCUTANEOUS at 09:23

## 2021-12-26 RX ADMIN — DEXAMETHASONE SODIUM PHOSPHATE 6 MG: 10 INJECTION, SOLUTION INTRAMUSCULAR; INTRAVENOUS at 03:36

## 2021-12-26 ASSESSMENT — PAIN SCALES - GENERAL
PAINLEVEL_OUTOF10: 0

## 2021-12-26 NOTE — CARE COORDINATION
CM spoke with the patient for discharge planning. Patient lives at home alone, has insurance with Rx coverage & PCP, stated that she was independent with ADL's prior to admission, and is still able to drive. Patient stated that she did not require the use of any assistive devices or home oxygen prior to admission. Patient reports that she lives next door to her daughter who assists her as needed. Patient stated that she is still requiring 2 l/nc oxygen currently and is hoping to be weaned off prior to discharge. SANA spoke with the patient regarding [place] [id number] services upon discharge but at this time the patient does not feel that will be necessary. Patient plans to return home upon discharge and is unable to identify any needs at this time.

## 2021-12-26 NOTE — PROGRESS NOTES
Nephrology Progress Note  12/26/2021 12:45 PM  Subjective: Interval History: Queenie Carlton is a 80 y.o. female with doing okay week resting in bed        Data:   Scheduled Meds:   urea  15 g Oral Daily    sodium chloride  1 g Oral BID     amLODIPine  5 mg Oral Daily    metoprolol succinate  100 mg Oral Daily    sodium chloride flush  5-40 mL IntraVENous 2 times per day    enoxaparin  40 mg SubCUTAneous Daily    dexamethasone  6 mg IntraVENous Q24H    insulin lispro  0-6 Units SubCUTAneous TID     insulin lispro  0-3 Units SubCUTAneous Nightly     Continuous Infusions:   sodium chloride      dextrose           CBC   Recent Labs     12/25/21  0740 12/26/21  0628   WBC 5.5 4.8   HGB 15.1 14.6   HCT 43.4 42.2    250      BMP   Recent Labs     12/25/21  0740 12/25/21  0740 12/26/21  0325 12/26/21  0628 12/26/21  1002   *   < > 130* 131* 127*   K 3.5  --   --  4.1  --    CL 85*  --   --  94*  --    CO2 24  --   --  26  --    PHOS  --   --   --  2.7  --    BUN 17  --   --  26*  --    CREATININE 0.8  --   --  0.8  --     < > = values in this interval not displayed. Hepatic:   Recent Labs     12/25/21  0740   AST 41*   ALT 39   BILITOT 0.9   ALKPHOS 97     Troponin: No results for input(s): TROPONINI in the last 72 hours. BNP: No results for input(s): BNP in the last 72 hours. Lipids: No results for input(s): CHOL, HDL in the last 72 hours. Invalid input(s): LDLCALCU  ABGs: No results found for: PHART, PO2ART, LVZ7JYX  INR: No results for input(s): INR in the last 72 hours.   Renal Labs  Albumin:    Lab Results   Component Value Date    LABALBU 3.6 12/26/2021     Calcium:    Lab Results   Component Value Date    CALCIUM 8.8 12/26/2021     Phosphorus:    Lab Results   Component Value Date    PHOS 2.7 12/26/2021     U/A:    Lab Results   Component Value Date    NITRU NEGATIVE 09/12/2020    COLORU YELLOW 09/12/2020    WBCUA 25 09/12/2020    RBCUA 0 09/12/2020    MUCUS NEGATIVE 10/07/2013    BACTERIA RARE 09/12/2020    CLARITYU CLEAR 09/12/2020    SPECGRAV 1.010 09/12/2020    UROBILINOGEN 0.2 09/12/2020    BILIRUBINUR NEGATIVE 09/12/2020    BLOODU NEGATIVE 09/12/2020    KETUA NEGATIVE 09/12/2020     ABG:  No results found for: PHART, FFC0RQX, PO2ART, LLZ0RLN, BEART, THGBART, ZAL8ELC, T4XBMMDQ  HgBA1c:    Lab Results   Component Value Date    LABA1C 6.7 08/17/2016     Microalbumen/Creatinine ratio:  No components found for: RUCREAT  TSH:  No results found for: TSH  IRON:  No results found for: IRON  Iron Saturation:  No components found for: PERCENTFE  TIBC:  No results found for: TIBC  FERRITIN:  No results found for: FERRITIN  RPR:  No results found for: RPR  COURTNEY:  No results found for: ANATITER, COURTNEY  24 Hour Urine for Creatinine Clearance:  No components found for: CREAT4, UHRS10, UTV10      Objective:   I/O: 12/25 0701 - 12/26 0700  In: 773.8 [I.V.:773.8]  Out: -   I/O last 3 completed shifts: In: 773.8 [I.V.:773.8]  Out: -   I/O this shift:  In: -   Out: 100 [Urine:100]  Vitals: BP (!) 154/49   Pulse 100   Temp 97.5 °F (36.4 °C)   Resp 20   Ht 5' 7\" (1.702 m)   Wt 158 lb 8.2 oz (71.9 kg)   SpO2 90%   BMI 24.83 kg/m²  {  General appearance: awake weak  HEENT: Head: Normal, normocephalic, atraumatic.   Neck: supple, symmetrical, trachea midline  Lungs: diminished breath sounds bilaterally  Heart: S1, S2 normal  Abdomen: abnormal findings:  soft nt  Extremities: edema trace  Neurologic: Mental status: alertness: Awake        Assessment and Plan:      IMP:  1 covid 19 +  2 hyponatremia  3 htn  4 Dm2  5 weak with sob    Plan     #1 treat in setting of COVID-19  #2 sodium low stable down to 127 but adjust meds  #3 adjust blood pressure meds  #4 monitor glucose control  #5 monitor oxygenation above setting will follow             Denis Moyer MD, MD

## 2021-12-26 NOTE — ED NOTES
Attempted to call report to [place].  Will call back.      Lindsay Combs RN  12/26/21 0032

## 2021-12-26 NOTE — PROGRESS NOTES
Skin assessment was performed per protocol by this RN and Gloria, patient skin is intact, patient is alert, weaned to 2L NC, patient gets dyspneic with exertion, no pain expressed, patient is alert and stable, fall precaution, telemetry, and continuous pulse ox monitoring ongoing. AM RN updated to continue care.

## 2021-12-26 NOTE — PROGRESS NOTES
Hospitalist Progress Note      Name:  Queenie Carlton /Age/Sex: 1931  (80 y.o. female)   MRN & CSN:  [id number] & [id number] Admission Date/Time: 2021  7:00 AM   Location:  [facility]- PCP: Alessia Guadarrama PA-C         Hospital Day: 2    Assessment and Plan:   Queenie Carlton is a 80 y.o.  female  who presents with     COVID-19  infection  Acute hypoxemic respiratory failure  -Currently on 2 L NC  -Admit to observation unit  -Continue Decadron  -Albuterol, supportive care   -S/p monoclonal antibody 21     Hyponatremia  -Sodium level 124 on admission  -Nephrology following. Medications adjusted  -Repeat sodium is 127.      Hypertension  -Continue home medication metoprolol and amlodipine  -Hold Maxzide due to hyponatremia     Diabetes  -Hold Metformin  -Insulin sliding scale     DVT prophylaxis  -Lovenox 40 mg daily    Diet ADULT DIET; Regular   DVT Prophylaxis [] Lovenox, []  Heparin, [] SCDs, [] Ambulation   GI Prophylaxis [] PPI,  [] H2 Blocker,  [] Carafate,  [] Diet/Tube Feeds   Code Status DNR-CCA   Disposition Patient requires continued admission due to hyponatremia and acute hypoxic respiratory failure   MDM [] Low, [] Moderate,[x]  High  Patient's risk as above due to older adult with recent Covid infection     History of Present Illness:     Chief Complaint: <principal problem not specified>  Queenie Carlton is a 80 y.o.  female  who presents with shortness of breath. Feels much better today. States that her breathing is much improved. Ten point ROS reviewed negative, unless as noted above    Objective: Intake/Output Summary (Last 24 hours) at 2021 1712  Last data filed at 2021 0825  Gross per 24 hour   Intake 773.82 ml   Output 100 ml   Net 673.82 ml      Vitals:   Vitals:    21 1645   BP: (!) 144/96   Pulse: 92   Resp: 19   Temp: 97.8 °F (36.6 °C)   SpO2:      Physical Exam:   GEN Awake female, sitting upright in bed in no apparent distress. Appears given age. Hard of hearing   HEENT NC/AT, mucous membranes pink, moist  RESP Clear to auscultation, no wheezes, rales or rhonchi. Symmetric chest movement while on 2 L  CARDIO/VASC S1/S2 auscultated. Regular rate without appreciable murmurs, rubs, or gallops. No JVD or carotid bruits. Peripheral pulses equal bilaterally and palpable. No peripheral edema. GI Abdomen is soft without significant tenderness, masses, or guarding. Bowel sounds are normoactive. Rectal exam deferred. MSK No gross joint deformities. SKIN Normal coloration, warm, dry. NEURO Cranial nerves appear grossly intact, normal speech, no lateralizing weakness. PSYCH Awake, alert, oriented x 4. Affect appropriate. Medications:   Medications:    cloNIDine  0.1 mg Oral BID    sodium chloride  1 g Oral TID WC    urea  15 g Oral Daily    amLODIPine  5 mg Oral Daily    metoprolol succinate  100 mg Oral Daily    sodium chloride flush  5-40 mL IntraVENous 2 times per day    enoxaparin  40 mg SubCUTAneous Daily    dexamethasone  6 mg IntraVENous Q24H    insulin lispro  0-6 Units SubCUTAneous TID WC    insulin lispro  0-3 Units SubCUTAneous Nightly      Infusions:    sodium chloride      dextrose       PRN Meds: sodium chloride flush, 10 mL, PRN  sodium chloride, 25 mL, PRN  ondansetron, 4 mg, Q8H PRN   Or  ondansetron, 4 mg, Q6H PRN  polyethylene glycol, 17 g, Daily PRN  nicotine polacrilex, 2 mg, Q1H PRN  acetaminophen, 650 mg, Q6H PRN   Or  acetaminophen, 650 mg, Q6H PRN  glucose, 15 g, PRN  dextrose, 12.5 g, PRN  glucagon (rDNA), 1 mg, PRN  dextrose, 100 mL/hr, PRN      Patient is still admitted because recent COVID-19 infection, with acute hypoxic respiratory failure and hyponatremia. the anticipated discharge is in less than 24 hours.      Electronically signed by Bird Day MD on 12/26/2021 at 5:12 PM

## 2021-12-27 VITALS
SYSTOLIC BLOOD PRESSURE: 116 MMHG | BODY MASS INDEX: 24.88 KG/M2 | WEIGHT: 158.51 LBS | RESPIRATION RATE: 16 BRPM | TEMPERATURE: 97.6 F | DIASTOLIC BLOOD PRESSURE: 72 MMHG | HEIGHT: 67 IN | OXYGEN SATURATION: 92 % | HEART RATE: 96 BPM

## 2021-12-27 PROBLEM — E87.1 HYPONATREMIA: Status: ACTIVE | Noted: 2021-12-27

## 2021-12-27 LAB
ALBUMIN SERPL-MCNC: 3.5 GM/DL (ref 3.4–5)
ANION GAP SERPL CALCULATED.3IONS-SCNC: 11 MMOL/L (ref 4–16)
BUN BLDV-MCNC: 31 MG/DL (ref 6–23)
C-REACTIVE PROTEIN, HIGH SENSITIVITY: 8.2 MG/L
CALCIUM SERPL-MCNC: 8.8 MG/DL (ref 8.3–10.6)
CHLORIDE BLD-SCNC: 95 MMOL/L (ref 99–110)
CO2: 25 MMOL/L (ref 21–32)
CREAT SERPL-MCNC: 0.8 MG/DL (ref 0.6–1.1)
GFR AFRICAN AMERICAN: >60 ML/MIN/1.73M2
GFR NON-AFRICAN AMERICAN: >60 ML/MIN/1.73M2
GLUCOSE BLD-MCNC: 208 MG/DL (ref 70–99)
GLUCOSE BLD-MCNC: 231 MG/DL (ref 70–99)
GLUCOSE BLD-MCNC: 255 MG/DL (ref 70–99)
PHOSPHORUS: 2.8 MG/DL (ref 2.5–4.9)
POTASSIUM SERPL-SCNC: 4.5 MMOL/L (ref 3.5–5.1)
SODIUM BLD-SCNC: 131 MMOL/L (ref 135–145)

## 2021-12-27 PROCEDURE — 80069 RENAL FUNCTION PANEL: CPT

## 2021-12-27 PROCEDURE — 36415 COLL VENOUS BLD VENIPUNCTURE: CPT

## 2021-12-27 PROCEDURE — 6370000000 HC RX 637 (ALT 250 FOR IP): Performed by: NURSE PRACTITIONER

## 2021-12-27 PROCEDURE — 80048 BASIC METABOLIC PNL TOTAL CA: CPT

## 2021-12-27 PROCEDURE — 2580000003 HC RX 258: Performed by: NURSE PRACTITIONER

## 2021-12-27 PROCEDURE — G0378 HOSPITAL OBSERVATION PER HR: HCPCS

## 2021-12-27 PROCEDURE — 6360000002 HC RX W HCPCS: Performed by: NURSE PRACTITIONER

## 2021-12-27 PROCEDURE — 86140 C-REACTIVE PROTEIN: CPT

## 2021-12-27 PROCEDURE — 6370000000 HC RX 637 (ALT 250 FOR IP): Performed by: INTERNAL MEDICINE

## 2021-12-27 PROCEDURE — 82962 GLUCOSE BLOOD TEST: CPT

## 2021-12-27 PROCEDURE — 96372 THER/PROPH/DIAG INJ SC/IM: CPT

## 2021-12-27 PROCEDURE — 96376 TX/PRO/DX INJ SAME DRUG ADON: CPT

## 2021-12-27 RX ORDER — DEXAMETHASONE 6 MG/1
6 TABLET ORAL
Qty: 3 TABLET | Refills: 0 | Status: SHIPPED | OUTPATIENT
Start: 2021-12-27 | End: 2021-12-30

## 2021-12-27 RX ORDER — TRIAMTERENE AND HYDROCHLOROTHIAZIDE 37.5; 25 MG/1; MG/1
1 TABLET ORAL DAILY
Qty: 30 TABLET | Refills: 5 | Status: SHIPPED | OUTPATIENT
Start: 2021-12-27

## 2021-12-27 RX ADMIN — SODIUM CHLORIDE, PRESERVATIVE FREE 10 ML: 5 INJECTION INTRAVENOUS at 10:24

## 2021-12-27 RX ADMIN — ENOXAPARIN SODIUM 40 MG: 100 INJECTION SUBCUTANEOUS at 10:24

## 2021-12-27 RX ADMIN — METOPROLOL SUCCINATE 100 MG: 50 TABLET, EXTENDED RELEASE ORAL at 10:22

## 2021-12-27 RX ADMIN — CLONIDINE HYDROCHLORIDE 0.1 MG: 0.1 TABLET ORAL at 10:23

## 2021-12-27 RX ADMIN — AMLODIPINE BESYLATE 5 MG: 5 TABLET ORAL at 10:23

## 2021-12-27 RX ADMIN — Medication 1 G: at 10:23

## 2021-12-27 RX ADMIN — Medication 15 G: at 10:23

## 2021-12-27 RX ADMIN — DEXAMETHASONE SODIUM PHOSPHATE 6 MG: 10 INJECTION, SOLUTION INTRAMUSCULAR; INTRAVENOUS at 00:24

## 2021-12-27 ASSESSMENT — PAIN SCALES - GENERAL: PAINLEVEL_OUTOF10: 0

## 2021-12-27 NOTE — CARE COORDINATION
Received a call from Hai Haro in the outpt pharmacy regarding pt's d/c medications. Pt has Medicare A/B and is positive for COVID. We have not helped pt before. Voucher created for dexamethasone, maxzide and faxed to the pharmacy.  Pt will go on the flag list. If she would need any further assistance, she must fill out an application and provide required documentation to be considered for eligibility./MB

## 2021-12-27 NOTE — DISCHARGE SUMMARY
amLODIPine 5 MG tablet  Commonly known as: NORVASC  Take 1 tablet by mouth daily     metFORMIN 500 MG tablet  Commonly known as: GLUCOPHAGE  Take 1 tablet by mouth 2 times daily (with meals)     metoprolol succinate 100 MG extended release tablet  Commonly known as: Toprol XL  Take 1 tablet by mouth daily     triamterene-hydroCHLOROthiazide 37.5-25 MG per tablet  Commonly known as: MAXZIDE-25  Take 1 tablet by mouth daily     vitamin D 50 MCG (2000 UT) Caps capsule           Where to Get Your Medications      These medications were sent to [address] [place] [address], [facility]    Phone: [phone]   · dexamethasone 6 MG tablet  · triamterene-hydroCHLOROthiazide 37.5-25 MG per tablet         Objective Findings at Discharge:   /72   Pulse 96   Temp 97.6 °F (36.4 °C) (Oral)   Resp 16   Ht 5' 7\" (1.702 m)   Wt 158 lb 8.2 oz (71.9 kg)   SpO2 92%   BMI 24.83 kg/m²            PHYSICAL EXAM  GEN Awake female, sitting upright in bed in no apparent distress. Appears given age. EYES Pupils are equally round. No scleral erythema, discharge, or conjunctivitis. HENT Mucous membranes are moist. Oral pharynx without exudates, no evidence of thrush. NECK Supple, no apparent thyromegaly or masses. RESP Clear to auscultation, no wheezes, rales or rhonchi. Symmetric chest movement while on room air. CARDIO/VASC S1/S2 auscultated. Regular rate without appreciable murmurs, rubs, or gallops. No JVD or carotid bruits. Peripheral pulses equal bilaterally and palpable. No peripheral edema. GI Abdomen is soft without significant tenderness, masses, or guarding. Bowel sounds are normoactive. MSK No gross joint deformities. SKIN Normal coloration, warm, dry. NEURO Cranial nerves appear grossly intact, normal speech, no lateralizing weakness. PSYCH Awake, alert, oriented x 4.   Affect appropriate. BMP/CBC  Recent Labs     12/25/21  0740 12/26/21  0325 12/26/21  0628 12/26/21  1002 12/26/21  1443 12/26/21  1825 12/27/21  0709   *   < > 131*   < > 127* 129* 131*   K 3.5  --  4.1  --   --   --  4.5   CL 85*  --  94*  --   --   --  95*   CO2 24  --  26  --   --   --  25   BUN 17  --  26*  --   --   --  31*   CREATININE 0.8  --  0.8  --   --   --  0.8   WBC 5.5  --  4.8  --   --   --   --    HCT 43.4  --  42.2  --   --   --   --      --  250  --   --   --   --     < > = values in this interval not displayed.        IMAGING:  Chest x-ray  FINDINGS:   The heart is similar in size to the prior study.  No pleural effusion or   pneumothorax is seen. Rheta Chivo are bilateral airspace opacities compatible with COVID-19     Discharge Time of 50 minutes    Electronically signed by Gerri Mojica MD on 12/27/2021 at 4:41 PM

## 2021-12-28 ENCOUNTER — CARE COORDINATION (OUTPATIENT)
Dept: CASE MANAGEMENT | Age: 86
End: 2021-12-28

## 2021-12-28 DIAGNOSIS — U07.1 COVID-19: Primary | ICD-10-CM

## 2021-12-28 PROCEDURE — 1111F DSCHRG MED/CURRENT MED MERGE: CPT | Performed by: FAMILY MEDICINE

## 2021-12-29 NOTE — CARE COORDINATION
Tara 45 Transitions Initial Follow Up Call    Call within 2 business days of discharge: Yes    Patient: Franklin Sequeira Patient : 1931   MRN: [id number]  Reason for Admission: [place] Failure  Discharge Date: 21 RARS: No data recorded    Last Discharge [facility]       Complaint Diagnosis Description Type Department Provider    21 Positive For Covid-19; Shortness of Breath; Generalized Body Aches COVID-19 . .. ED to Hosp-Admission (Discharged) (ADMITTED) Shayna Cuevas MD; LEIGHA Morton. Non-face-to-face services provided:  Obtained and reviewed discharge summary and/or continuity of care documents  Education of patient/family/caregiver/guardian to support self-management-Covid19  Assessment and support for treatment adherence and medication management-1111f    Care Transitions 24 Hour Call    Schedule Follow Up Appointment with PCP: Declined  Do you have any ongoing symptoms?: No  Do you have a copy of your discharge instructions?: Yes  Do you have all of your prescriptions and are they filled?: Yes  Have you been contacted by a [facility] Pharmacist?: No  Have you scheduled your follow up appointment?: Yes  How are you going to get to your appointment?: Car - family or friend to transport  Were you discharged with any Home Care or Post Acute Services: No  Do you feel like you have everything you need to keep you well at home?: Yes  Care Transitions Interventions  No Identified Needs   Home Care Waiver: Declined        Transportation Support: Declined      DME Assistance: Declined     Senior Services: Declined         Challenges to be reviewed by the provider   Additional needs identified to be addressed with provider: denies     Method of communication with provider : none    COVID19 + MONITORING   Covid Risk Score: 5   Facility: [facility]    Was this a readmission?  No  Patient stated reason for admission: SOB  Patients top risk factors for readmission: medical condition-Covid 23, DM, PVD    Spoke with Patient for Care Transition discharge call, verified Patient name and  as identifiers. Introduced self and explained CT program. Agreeable to ongoing CT follow up. Phone Assessment: Reports doing \"reely good\". Denies sob, cough, fever, chills, malaise or other Covid19 related sx. Reports eating, drinking well. Has added to Ensure supplements. B&B wnl. Reports \"taking it easy\", pacing self; is ambulating well, no assistive devises needed. No O2, pulse ox or IS. Education Provided: Reviewed discharge instructions, medical action plan and Covid19 red flags such as increased shortness of breath, increasing fever and signs of decompensation. Advised rest, pacing activity, adequate hydration/nutrition (within any MD recommended dietary/fluid restrictions). Discussed increased risk of blood clots associated w/ Covid19, sx and preventative measures. Discussed exposure protocols and quarantine with CDC Guidelines What to do if you are sick with coronavirus disease .  Patient verbalizes understanding. Has family/friends who are able to drop off any needed supplies or food. Advised to contact Health Department PCP re: any additional Covid19 questions. Patient has not received Covid 19 vaccine series, advised to speak further w/ PCP due to risk factors. Advised continued Covid19 preventative measures including mask covering nose and mouth, social distancing, hand washing, contacting MD at first sign of any flu-like sx. AVS/Meds: Confirmed copy of AVS received, reviewed with Patient. Confirmed Patient obtained and is taking Decadron as directed. Med education provided, questions answered, verbalizes understanding. Stressed importance of med compliance. 1111F medication review completed with Patient . Advised obtaining 90 day supply of routine, prn meds. Advised contacting pharmacy/md for refill needs.      Resource Need Assessment: Living Arrangements: lives alone-- good family support, dtr lives next door  ADLS:independent, drives   DME:none, denies need   Transportation: self or family  HHC:none, denies need   Community Assistance:none, denies need   Referrals Made per CTN with Patient/Family Approval: [id number] Follow Up Appointments: Discussed importance of 7 day hospital follow up appointment for continuity of care. Transportation confirmed for 1/3/21 am appt w/ PCP. Advanced Care Planning: Reports Advanced Directives up to date, reflecting current wishes . Encouraged to place on file w/ PCP, [address] [place] [address]. Healthcare Decision Maker:    Primary Decision Maker: Lukeobey Jean-Baptisterichard  Beatris - [phone]    Advised to contact PCP 24/7 re: any health concerns for early outpatient intervention in an effort to avoid hospitalization. Discussed benefits of WIC, Urgent Care. Advised 911 if noting acute distress. Plan for next outreach in 5-7 days as Patient doing well, no needs.  Upon next outreach: f/u on appt, confirm Decadron completed, DM ZM ed      Akanksha Thomason, RN

## 2022-01-05 ENCOUNTER — CARE COORDINATION (OUTPATIENT)
Dept: CASE MANAGEMENT | Age: 87
End: 2022-01-05

## 2022-01-07 NOTE — CARE COORDINATION
Tara 45 Transitions Follow Up Call    2022    Patient: Norman Noland  Patient : 1931   MRN: [id number]  Reason for Admission: [place] Failure  Discharge Date: 21 RARS: No data recorded    Care Transitions Subsequent and Final Call    Subsequent and Final Calls  Do you have any ongoing symptoms?: No  Have your medications changed?: No  Do you have any questions related to your medications?: No  Do you currently have any active services?: No  Do you have any needs or concerns that I can assist you with?: No  Identified Barriers: Other  Care Transitions Interventions  No Identified Needs   Home Care Waiver: Declined        Transportation Support: Declined      DME Assistance: Declined     Senior Services: Declined    Other Interventions:         Patient resolved from the Care Transitions episode on 22    Patient has been provided the following resources and education related to COVID-19:                         Signs, symptoms and red flags related to COVID-19            CDC exposure and quarantine guidelines            [facility] exposure contact - [phone]            Contact for their local Department of Health                 Patient currently reports \"doing real good, especially the last past couple of days\". Reports all Covid19 sx resolved. Completed Decadron. Was seen by PCP 1/3/22. Continues to deny resource needs as well supported by family. Denies need for ongoing follow up. No further outreach scheduled with this CTN/ACM. Episode of Care resolved. Patient has this CTN/ACM contact information if future needs arise.   [address] [place] [address] [phone]

## 2024-06-18 ENCOUNTER — TELEPHONE (OUTPATIENT)
Dept: CARDIOLOGY CLINIC | Age: 89
End: 2024-06-18

## 2024-06-27 ENCOUNTER — TELEPHONE (OUTPATIENT)
Dept: CARDIOLOGY CLINIC | Age: 89
End: 2024-06-27

## 2024-06-27 ENCOUNTER — INITIAL CONSULT (OUTPATIENT)
Dept: CARDIOLOGY CLINIC | Age: 89
End: 2024-06-27
Payer: MEDICARE

## 2024-06-27 VITALS
DIASTOLIC BLOOD PRESSURE: 72 MMHG | SYSTOLIC BLOOD PRESSURE: 158 MMHG | HEIGHT: 64 IN | BODY MASS INDEX: 24.24 KG/M2 | HEART RATE: 84 BPM | WEIGHT: 142 LBS

## 2024-06-27 DIAGNOSIS — E11.9 CONTROLLED TYPE 2 DIABETES MELLITUS WITHOUT COMPLICATION, WITHOUT LONG-TERM CURRENT USE OF INSULIN (HCC): ICD-10-CM

## 2024-06-27 DIAGNOSIS — Z79.899 MEDICATION MANAGEMENT: Primary | ICD-10-CM

## 2024-06-27 DIAGNOSIS — I10 PRIMARY HYPERTENSION: ICD-10-CM

## 2024-06-27 PROCEDURE — 99204 OFFICE O/P NEW MOD 45 MIN: CPT | Performed by: INTERNAL MEDICINE

## 2024-06-27 PROCEDURE — G8428 CUR MEDS NOT DOCUMENT: HCPCS | Performed by: INTERNAL MEDICINE

## 2024-06-27 PROCEDURE — 1090F PRES/ABSN URINE INCON ASSESS: CPT | Performed by: INTERNAL MEDICINE

## 2024-06-27 PROCEDURE — 4004F PT TOBACCO SCREEN RCVD TLK: CPT | Performed by: INTERNAL MEDICINE

## 2024-06-27 PROCEDURE — 1123F ACP DISCUSS/DSCN MKR DOCD: CPT | Performed by: INTERNAL MEDICINE

## 2024-06-27 PROCEDURE — G8420 CALC BMI NORM PARAMETERS: HCPCS | Performed by: INTERNAL MEDICINE

## 2024-06-27 NOTE — ASSESSMENT & PLAN NOTE
Patient is to start taking Toprol 50 mg daily.  She has 100 mg tablets she can split into half and take half a day.  I rather see her resting heart rate in 50s and 60s then in 80s as it is common to have diastolic dysfunction at her age and Toprol has significant cardioprotective effect and particularly she does not want any test to be done.  I expect her to have hypertensive heart disease.  Continue amlodipine and Maxide at the current dose.  They verbalized understanding and will follow through.  If needed we can down titrate or uptitrate Toprol.  It is not recommended to stop it from the high-dose as it can precipitate rebound hypertension, tachycardia and cardiac decompensation if she has any significant underlying hypertensive heart disease.  Patient declines echocardiogram or any further testings.

## 2024-06-27 NOTE — PROGRESS NOTES
EKG received by fax from PCP is poor quality cannot rule out underlying atrial flutter with AV block.  T wave inversions are noted in lateral leads suggestive of LV strain pattern.  Patient has bradycardia heart rate is around 50 bpm.  Repeat EKG on follow-up visit and try to get a clear copy of this EKG from PCPs office for comparison and further comments.  
(36.1 °C) (Infrared)      BP Readings from Last 3 Encounters:   06/27/24 (!) 158/72   12/27/21 116/72   12/19/21 134/82      Pulse Readings from Last 3 Encounters:   06/27/24 84   12/27/21 96   12/19/21 74        Body mass index is 24.37 kg/m².    Constitutional: Normally built and nourished female in no apparent distress  Eyes: No conjunctival pallor  NECK: No JVP or thyromegaly  Cardiovascular:  Auscultation: Normal S1 and S2.  1/6 systolic murmur noted in aortic area.  Carotids no significant bruits noted.  Abdominal aorta is nonpalpable.  No epigastric bruit noted. Peripheral pulses: Pedal pulses are 1+ equal in both.  Respiratory:  Respiratory effort is normal.  Breath sounds are clear to auscultation  Extremities: Trace ankle edema noted.   SKIN: Warm and well perfused, no pallor or cyanosis  Abdomen: Nondistended soft bowel sounds are normal.  No masses or tenderness. No organomegaly noted.  Neurologic:  Oriented to time, place, and person and non-anxious. No focal neurological deficit noted.  Psychiatric: Normal mood and effect.     LAB REVIEW:  CBC:   Lab Results   Component Value Date/Time    WBC 4.8 12/26/2021 06:28 AM    HGB 14.6 12/26/2021 06:28 AM    HCT 42.2 12/26/2021 06:28 AM     12/26/2021 06:28 AM     Lipids:   Lab Results   Component Value Date    CHOL 224 (H) 08/17/2016    CHOL 229 07/28/2015     Lab Results   Component Value Date    TRIG 102 08/17/2016    TRIG 126 07/28/2015     Lab Results   Component Value Date    HDL 60 08/17/2016    HDL 57 07/28/2015     Lab Results   Component Value Date     (A) 07/28/2015     Renal:   Lab Results   Component Value Date/Time    BUN 31 12/27/2021 07:09 AM    CREATININE 0.8 12/27/2021 07:09 AM     12/27/2021 07:09 AM    K 4.5 12/27/2021 07:09 AM     PT/INR: No results found for: \"INR\"  Lab Results   Component Value Date    LABA1C 6.7 (H) 08/17/2016     EKG: Patient declined repeat EKG we have called PCP to fax us the EKG done at their

## 2024-06-27 NOTE — TELEPHONE ENCOUNTER
Spoke with daughter, Sugar, to ask her to obtain another copy of her mom's EKG. Fax that was received by PCP's office is not legible. Daughter agreed to obtain copy from PCP's office and bring into the office for provider.

## 2024-06-27 NOTE — ASSESSMENT & PLAN NOTE
Today her blood pressure is higher than it should be and she reports that her blood pressure is running around 1  systolic at home also which she thinks is fine for her.  I asked her if she is feeling less fatigue since she has been off of Toprol she says she does not feel any different.  Denies any palpitations.  I would resume Toprol at lower dose than what she was taking.  She was on 100 mg daily and I would resume it at 50 mg daily and see her again in 4 weeks for follow-up.

## 2024-06-27 NOTE — ASSESSMENT & PLAN NOTE
Diet controlled and his daughter reports that her hemoglobin A1c has been around 6.  She is not on any medications.  At one time she was on metformin but she is off of it.

## 2024-09-08 ENCOUNTER — APPOINTMENT (OUTPATIENT)
Dept: GENERAL RADIOLOGY | Age: 89
DRG: 291 | End: 2024-09-08
Attending: EMERGENCY MEDICINE
Payer: MEDICARE

## 2024-09-08 ENCOUNTER — HOSPITAL ENCOUNTER (INPATIENT)
Age: 89
LOS: 2 days | Discharge: HOME | DRG: 291 | End: 2024-09-10
Attending: EMERGENCY MEDICINE | Admitting: STUDENT IN AN ORGANIZED HEALTH CARE EDUCATION/TRAINING PROGRAM
Payer: MEDICARE

## 2024-09-08 DIAGNOSIS — I50.9 NEW ONSET OF CONGESTIVE HEART FAILURE (HCC): ICD-10-CM

## 2024-09-08 DIAGNOSIS — J96.01 ACUTE RESPIRATORY FAILURE WITH HYPOXIA (HCC): Primary | ICD-10-CM

## 2024-09-08 DIAGNOSIS — I48.91 NEW ONSET ATRIAL FIBRILLATION (HCC): ICD-10-CM

## 2024-09-08 DIAGNOSIS — I42.9 CARDIOMYOPATHY, UNSPECIFIED TYPE (HCC): ICD-10-CM

## 2024-09-08 PROBLEM — I48.0 PAROXYSMAL ATRIAL FIBRILLATION (HCC): Status: ACTIVE | Noted: 2024-09-08

## 2024-09-08 PROBLEM — I50.33 ACUTE ON CHRONIC DIASTOLIC CONGESTIVE HEART FAILURE (HCC): Status: ACTIVE | Noted: 2024-09-08

## 2024-09-08 LAB
ANION GAP SERPL CALCULATED.3IONS-SCNC: 14 MMOL/L (ref 9–17)
B PARAP IS1001 DNA NPH QL NAA+NON-PROBE: NOT DETECTED
B PERT DNA SPEC QL NAA+PROBE: NOT DETECTED
BASOPHILS # BLD: 0.3 K/UL
BASOPHILS NFR BLD: 2 % (ref 0–1)
BNP SERPL-MCNC: 2700 PG/ML (ref 0–450)
BUN SERPL-MCNC: 20 MG/DL (ref 7–20)
C PNEUM DNA NPH QL NAA+NON-PROBE: NOT DETECTED
CALCIUM SERPL-MCNC: 9.1 MG/DL (ref 8.3–10.6)
CHLORIDE SERPL-SCNC: 101 MMOL/L (ref 99–110)
CO2 SERPL-SCNC: 22 MMOL/L (ref 21–32)
CREAT SERPL-MCNC: 0.9 MG/DL (ref 0.6–1.2)
EKG ATRIAL RATE: 234 BPM
EKG DIAGNOSIS: NORMAL
EKG Q-T INTERVAL: 454 MS
EKG QRS DURATION: 82 MS
EKG QTC CALCULATION (BAZETT): 490 MS
EKG R AXIS: 54 DEGREES
EKG T AXIS: 204 DEGREES
EKG VENTRICULAR RATE: 70 BPM
EOSINOPHIL # BLD: 0.15 K/UL
EOSINOPHILS RELATIVE PERCENT: 1 % (ref 0–3)
ERYTHROCYTE [DISTWIDTH] IN BLOOD BY AUTOMATED COUNT: 16.9 % (ref 11.7–14.9)
EST. AVERAGE GLUCOSE BLD GHB EST-MCNC: 140 MG/DL
FLUAV RNA NPH QL NAA+NON-PROBE: NOT DETECTED
FLUBV RNA NPH QL NAA+NON-PROBE: NOT DETECTED
GFR, ESTIMATED: 58 ML/MIN/1.73M2
GLUCOSE SERPL-MCNC: 175 MG/DL (ref 74–99)
HADV DNA NPH QL NAA+NON-PROBE: NOT DETECTED
HBA1C MFR BLD: 6.5 % (ref 4.2–6.3)
HCOV 229E RNA NPH QL NAA+NON-PROBE: NOT DETECTED
HCOV HKU1 RNA NPH QL NAA+NON-PROBE: NOT DETECTED
HCOV NL63 RNA NPH QL NAA+NON-PROBE: NOT DETECTED
HCOV OC43 RNA NPH QL NAA+NON-PROBE: NOT DETECTED
HCT VFR BLD AUTO: 37.6 % (ref 37–47)
HGB BLD-MCNC: 11.9 G/DL (ref 12.5–16)
HMPV RNA NPH QL NAA+NON-PROBE: NOT DETECTED
HPIV1 RNA NPH QL NAA+NON-PROBE: NOT DETECTED
HPIV2 RNA NPH QL NAA+NON-PROBE: NOT DETECTED
HPIV3 RNA NPH QL NAA+NON-PROBE: NOT DETECTED
HPIV4 RNA NPH QL NAA+NON-PROBE: NOT DETECTED
INR PPP: 1
LACTATE BLDV-SCNC: 2 MMOL/L (ref 0.4–2)
LYMPHOCYTES NFR BLD: 4.62 K/UL
LYMPHOCYTES RELATIVE PERCENT: 31 % (ref 24–44)
M PNEUMO DNA NPH QL NAA+NON-PROBE: NOT DETECTED
MAGNESIUM SERPL-MCNC: 2 MG/DL (ref 1.8–2.4)
MCH RBC QN AUTO: 28.2 PG (ref 27–31)
MCHC RBC AUTO-ENTMCNC: 31.6 G/DL (ref 32–36)
MCV RBC AUTO: 89.1 FL (ref 78–100)
MONOCYTES NFR BLD: 1.04 K/UL
MONOCYTES NFR BLD: 7 % (ref 0–4)
NEUTROPHILS NFR BLD: 59 % (ref 36–66)
NEUTS SEG NFR BLD: 8.79 K/UL
PARTIAL THROMBOPLASTIN TIME: 29.7 SEC (ref 25.1–37.1)
PLATELET # BLD AUTO: 230 K/UL (ref 140–440)
PLATELET ESTIMATE: NORMAL
PMV BLD AUTO: 10.8 FL (ref 7.5–11.1)
POTASSIUM SERPL-SCNC: 3.8 MMOL/L (ref 3.5–5.1)
PROTHROMBIN TIME: 13.8 SEC (ref 11.7–14.5)
RBC # BLD AUTO: 4.22 M/UL (ref 4.2–5.4)
RBC # BLD: NORMAL 10*6/UL
RSV RNA NPH QL NAA+NON-PROBE: NOT DETECTED
RV+EV RNA NPH QL NAA+NON-PROBE: NOT DETECTED
SARS-COV-2 RNA NPH QL NAA+NON-PROBE: NOT DETECTED
SODIUM SERPL-SCNC: 137 MMOL/L (ref 136–145)
SPECIMEN DESCRIPTION: NORMAL
T4 FREE SERPL-MCNC: 1.3 NG/DL (ref 0.9–1.8)
TROPONIN I SERPL HS-MCNC: 19 NG/L (ref 0–14)
TROPONIN I SERPL HS-MCNC: 23 NG/L (ref 0–14)
TSH SERPL DL<=0.05 MIU/L-ACNC: 6.93 UIU/ML (ref 0.27–4.2)
WBC # BLD: NORMAL 10*3/UL
WBC OTHER # BLD: 14.9 K/UL (ref 4–10.5)

## 2024-09-08 PROCEDURE — 6370000000 HC RX 637 (ALT 250 FOR IP): Performed by: STUDENT IN AN ORGANIZED HEALTH CARE EDUCATION/TRAINING PROGRAM

## 2024-09-08 PROCEDURE — 84443 ASSAY THYROID STIM HORMONE: CPT

## 2024-09-08 PROCEDURE — 0202U NFCT DS 22 TRGT SARS-COV-2: CPT

## 2024-09-08 PROCEDURE — 6360000002 HC RX W HCPCS: Performed by: EMERGENCY MEDICINE

## 2024-09-08 PROCEDURE — 99223 1ST HOSP IP/OBS HIGH 75: CPT | Performed by: INTERNAL MEDICINE

## 2024-09-08 PROCEDURE — 71045 X-RAY EXAM CHEST 1 VIEW: CPT

## 2024-09-08 PROCEDURE — 84439 ASSAY OF FREE THYROXINE: CPT

## 2024-09-08 PROCEDURE — 94660 CPAP INITIATION&MGMT: CPT

## 2024-09-08 PROCEDURE — 36415 COLL VENOUS BLD VENIPUNCTURE: CPT

## 2024-09-08 PROCEDURE — 94761 N-INVAS EAR/PLS OXIMETRY MLT: CPT

## 2024-09-08 PROCEDURE — 6370000000 HC RX 637 (ALT 250 FOR IP): Performed by: INTERNAL MEDICINE

## 2024-09-08 PROCEDURE — 96365 THER/PROPH/DIAG IV INF INIT: CPT

## 2024-09-08 PROCEDURE — 83735 ASSAY OF MAGNESIUM: CPT

## 2024-09-08 PROCEDURE — 93005 ELECTROCARDIOGRAM TRACING: CPT | Performed by: EMERGENCY MEDICINE

## 2024-09-08 PROCEDURE — 2060000000 HC ICU INTERMEDIATE R&B

## 2024-09-08 PROCEDURE — 85025 COMPLETE CBC W/AUTO DIFF WBC: CPT

## 2024-09-08 PROCEDURE — 5A09357 ASSISTANCE WITH RESPIRATORY VENTILATION, LESS THAN 24 CONSECUTIVE HOURS, CONTINUOUS POSITIVE AIRWAY PRESSURE: ICD-10-PCS | Performed by: STUDENT IN AN ORGANIZED HEALTH CARE EDUCATION/TRAINING PROGRAM

## 2024-09-08 PROCEDURE — 82805 BLOOD GASES W/O2 SATURATION: CPT

## 2024-09-08 PROCEDURE — 94640 AIRWAY INHALATION TREATMENT: CPT

## 2024-09-08 PROCEDURE — 99285 EMERGENCY DEPT VISIT HI MDM: CPT

## 2024-09-08 PROCEDURE — 6360000002 HC RX W HCPCS: Performed by: STUDENT IN AN ORGANIZED HEALTH CARE EDUCATION/TRAINING PROGRAM

## 2024-09-08 PROCEDURE — 85610 PROTHROMBIN TIME: CPT

## 2024-09-08 PROCEDURE — 93010 ELECTROCARDIOGRAM REPORT: CPT | Performed by: INTERNAL MEDICINE

## 2024-09-08 PROCEDURE — 96375 TX/PRO/DX INJ NEW DRUG ADDON: CPT

## 2024-09-08 PROCEDURE — 6370000000 HC RX 637 (ALT 250 FOR IP): Performed by: EMERGENCY MEDICINE

## 2024-09-08 PROCEDURE — 83880 ASSAY OF NATRIURETIC PEPTIDE: CPT

## 2024-09-08 PROCEDURE — 2580000003 HC RX 258: Performed by: STUDENT IN AN ORGANIZED HEALTH CARE EDUCATION/TRAINING PROGRAM

## 2024-09-08 PROCEDURE — 87040 BLOOD CULTURE FOR BACTERIA: CPT

## 2024-09-08 PROCEDURE — 83036 HEMOGLOBIN GLYCOSYLATED A1C: CPT

## 2024-09-08 PROCEDURE — 80048 BASIC METABOLIC PNL TOTAL CA: CPT

## 2024-09-08 PROCEDURE — 2700000000 HC OXYGEN THERAPY PER DAY

## 2024-09-08 PROCEDURE — 85730 THROMBOPLASTIN TIME PARTIAL: CPT

## 2024-09-08 PROCEDURE — 83605 ASSAY OF LACTIC ACID: CPT

## 2024-09-08 PROCEDURE — 84484 ASSAY OF TROPONIN QUANT: CPT

## 2024-09-08 RX ORDER — POLYETHYLENE GLYCOL 3350 17 G/17G
17 POWDER, FOR SOLUTION ORAL DAILY PRN
Status: DISCONTINUED | OUTPATIENT
Start: 2024-09-08 | End: 2024-09-10 | Stop reason: HOSPADM

## 2024-09-08 RX ORDER — NITROGLYCERIN 0.4 MG/1
0.4 TABLET SUBLINGUAL ONCE
Status: COMPLETED | OUTPATIENT
Start: 2024-09-08 | End: 2024-09-08

## 2024-09-08 RX ORDER — METOPROLOL SUCCINATE 50 MG/1
50 TABLET, EXTENDED RELEASE ORAL DAILY
Status: DISCONTINUED | OUTPATIENT
Start: 2024-09-08 | End: 2024-09-10 | Stop reason: HOSPADM

## 2024-09-08 RX ORDER — MAGNESIUM SULFATE IN WATER 40 MG/ML
2000 INJECTION, SOLUTION INTRAVENOUS ONCE
Status: COMPLETED | OUTPATIENT
Start: 2024-09-08 | End: 2024-09-08

## 2024-09-08 RX ORDER — SODIUM CHLORIDE 0.9 % (FLUSH) 0.9 %
5-40 SYRINGE (ML) INJECTION PRN
Status: DISCONTINUED | OUTPATIENT
Start: 2024-09-08 | End: 2024-09-10 | Stop reason: HOSPADM

## 2024-09-08 RX ORDER — ACETAMINOPHEN 325 MG/1
650 TABLET ORAL EVERY 6 HOURS PRN
Status: DISCONTINUED | OUTPATIENT
Start: 2024-09-08 | End: 2024-09-10 | Stop reason: HOSPADM

## 2024-09-08 RX ORDER — ACETAMINOPHEN 650 MG/1
650 SUPPOSITORY RECTAL EVERY 6 HOURS PRN
Status: DISCONTINUED | OUTPATIENT
Start: 2024-09-08 | End: 2024-09-10 | Stop reason: HOSPADM

## 2024-09-08 RX ORDER — POTASSIUM CHLORIDE 7.45 MG/ML
10 INJECTION INTRAVENOUS PRN
Status: DISCONTINUED | OUTPATIENT
Start: 2024-09-08 | End: 2024-09-09

## 2024-09-08 RX ORDER — IPRATROPIUM BROMIDE AND ALBUTEROL SULFATE 2.5; .5 MG/3ML; MG/3ML
1 SOLUTION RESPIRATORY (INHALATION) ONCE
Status: COMPLETED | OUTPATIENT
Start: 2024-09-08 | End: 2024-09-08

## 2024-09-08 RX ORDER — METOPROLOL SUCCINATE 50 MG/1
25 TABLET, EXTENDED RELEASE ORAL DAILY
COMMUNITY

## 2024-09-08 RX ORDER — FUROSEMIDE 10 MG/ML
40 INJECTION INTRAMUSCULAR; INTRAVENOUS 2 TIMES DAILY
Status: DISCONTINUED | OUTPATIENT
Start: 2024-09-08 | End: 2024-09-09

## 2024-09-08 RX ORDER — FUROSEMIDE 10 MG/ML
40 INJECTION INTRAMUSCULAR; INTRAVENOUS ONCE
Status: COMPLETED | OUTPATIENT
Start: 2024-09-08 | End: 2024-09-08

## 2024-09-08 RX ORDER — MAGNESIUM SULFATE IN WATER 40 MG/ML
2000 INJECTION, SOLUTION INTRAVENOUS PRN
Status: DISCONTINUED | OUTPATIENT
Start: 2024-09-08 | End: 2024-09-09

## 2024-09-08 RX ORDER — AMLODIPINE BESYLATE 5 MG/1
5 TABLET ORAL DAILY
Status: DISCONTINUED | OUTPATIENT
Start: 2024-09-08 | End: 2024-09-10 | Stop reason: HOSPADM

## 2024-09-08 RX ORDER — ASPIRIN 81 MG/1
162 TABLET, CHEWABLE ORAL ONCE
Status: COMPLETED | OUTPATIENT
Start: 2024-09-08 | End: 2024-09-08

## 2024-09-08 RX ORDER — ONDANSETRON 4 MG/1
4 TABLET, ORALLY DISINTEGRATING ORAL EVERY 8 HOURS PRN
Status: DISCONTINUED | OUTPATIENT
Start: 2024-09-08 | End: 2024-09-10 | Stop reason: HOSPADM

## 2024-09-08 RX ORDER — SODIUM CHLORIDE 0.9 % (FLUSH) 0.9 %
5-40 SYRINGE (ML) INJECTION EVERY 12 HOURS SCHEDULED
Status: DISCONTINUED | OUTPATIENT
Start: 2024-09-08 | End: 2024-09-10 | Stop reason: HOSPADM

## 2024-09-08 RX ORDER — ONDANSETRON 2 MG/ML
4 INJECTION INTRAMUSCULAR; INTRAVENOUS EVERY 6 HOURS PRN
Status: DISCONTINUED | OUTPATIENT
Start: 2024-09-08 | End: 2024-09-10 | Stop reason: HOSPADM

## 2024-09-08 RX ORDER — ENOXAPARIN SODIUM 100 MG/ML
1 INJECTION SUBCUTANEOUS 2 TIMES DAILY
Status: DISCONTINUED | OUTPATIENT
Start: 2024-09-08 | End: 2024-09-08

## 2024-09-08 RX ORDER — POTASSIUM CHLORIDE 1500 MG/1
40 TABLET, EXTENDED RELEASE ORAL PRN
Status: DISCONTINUED | OUTPATIENT
Start: 2024-09-08 | End: 2024-09-09

## 2024-09-08 RX ORDER — SODIUM CHLORIDE 9 MG/ML
INJECTION, SOLUTION INTRAVENOUS PRN
Status: DISCONTINUED | OUTPATIENT
Start: 2024-09-08 | End: 2024-09-10 | Stop reason: HOSPADM

## 2024-09-08 RX ADMIN — FUROSEMIDE 40 MG: 10 INJECTION, SOLUTION INTRAMUSCULAR; INTRAVENOUS at 18:22

## 2024-09-08 RX ADMIN — MAGNESIUM SULFATE HEPTAHYDRATE 2000 MG: 40 INJECTION, SOLUTION INTRAVENOUS at 04:38

## 2024-09-08 RX ADMIN — ASPIRIN 81 MG CHEWABLE TABLET 162 MG: 81 TABLET CHEWABLE at 05:39

## 2024-09-08 RX ADMIN — IPRATROPIUM BROMIDE AND ALBUTEROL SULFATE 1 DOSE: 2.5; .5 SOLUTION RESPIRATORY (INHALATION) at 04:43

## 2024-09-08 RX ADMIN — AMLODIPINE BESYLATE 5 MG: 5 TABLET ORAL at 10:42

## 2024-09-08 RX ADMIN — SODIUM CHLORIDE, PRESERVATIVE FREE 10 ML: 5 INJECTION INTRAVENOUS at 10:44

## 2024-09-08 RX ADMIN — NITROGLYCERIN 0.4 MG: 0.4 TABLET SUBLINGUAL at 04:38

## 2024-09-08 RX ADMIN — SODIUM CHLORIDE, PRESERVATIVE FREE 10 ML: 5 INJECTION INTRAVENOUS at 20:09

## 2024-09-08 RX ADMIN — APIXABAN 5 MG: 2.5 TABLET, FILM COATED ORAL at 14:18

## 2024-09-08 RX ADMIN — ENOXAPARIN SODIUM 60 MG: 100 INJECTION SUBCUTANEOUS at 06:50

## 2024-09-08 RX ADMIN — APIXABAN 5 MG: 2.5 TABLET, FILM COATED ORAL at 20:13

## 2024-09-08 RX ADMIN — FUROSEMIDE 40 MG: 10 INJECTION, SOLUTION INTRAMUSCULAR; INTRAVENOUS at 05:39

## 2024-09-08 RX ADMIN — METOPROLOL SUCCINATE 50 MG: 50 TABLET, EXTENDED RELEASE ORAL at 10:42

## 2024-09-08 ASSESSMENT — PAIN SCALES - GENERAL
PAINLEVEL_OUTOF10: 5
PAINLEVEL_OUTOF10: 0
PAINLEVEL_OUTOF10: 0

## 2024-09-08 ASSESSMENT — LIFESTYLE VARIABLES
HOW MANY STANDARD DRINKS CONTAINING ALCOHOL DO YOU HAVE ON A TYPICAL DAY: PATIENT DOES NOT DRINK
HOW OFTEN DO YOU HAVE A DRINK CONTAINING ALCOHOL: NEVER

## 2024-09-08 ASSESSMENT — PAIN - FUNCTIONAL ASSESSMENT: PAIN_FUNCTIONAL_ASSESSMENT: 0-10

## 2024-09-09 ENCOUNTER — APPOINTMENT (OUTPATIENT)
Dept: NON INVASIVE DIAGNOSTICS | Age: 89
DRG: 291 | End: 2024-09-09
Attending: STUDENT IN AN ORGANIZED HEALTH CARE EDUCATION/TRAINING PROGRAM
Payer: MEDICARE

## 2024-09-09 LAB
ALBUMIN SERPL-MCNC: 3.9 G/DL (ref 3.4–5)
ALBUMIN/GLOB SERPL: 1.9 {RATIO} (ref 1.1–2.2)
ALP SERPL-CCNC: 115 U/L (ref 40–129)
ALT SERPL-CCNC: 16 U/L (ref 10–40)
ANION GAP SERPL CALCULATED.3IONS-SCNC: 13 MMOL/L (ref 9–17)
ARTERIAL PATENCY WRIST A: ABNORMAL
ARTERIAL PATENCY WRIST A: ABNORMAL
AST SERPL-CCNC: 22 U/L (ref 15–37)
BASOPHILS # BLD: 0.05 K/UL
BASOPHILS NFR BLD: 1 % (ref 0–1)
BILIRUB SERPL-MCNC: 1.7 MG/DL (ref 0–1)
BNP SERPL-MCNC: 3570 PG/ML (ref 0–450)
BODY TEMPERATURE: 37
BODY TEMPERATURE: 37
BUN SERPL-MCNC: 16 MG/DL (ref 7–20)
CALCIUM SERPL-MCNC: 8.8 MG/DL (ref 8.3–10.6)
CHLORIDE SERPL-SCNC: 94 MMOL/L (ref 99–110)
CO2 SERPL-SCNC: 30 MMOL/L (ref 21–32)
COHGB MFR BLD: 0.5 % (ref 0.5–1.5)
COHGB MFR BLD: 0.7 % (ref 0.5–1.5)
CREAT SERPL-MCNC: 0.8 MG/DL (ref 0.6–1.2)
ECHO AO ROOT DIAM: 2.5 CM
ECHO AO ROOT INDEX: 1.44 CM/M2
ECHO AV AREA PEAK VELOCITY: 1.6 CM2
ECHO AV AREA VTI: 1.4 CM2
ECHO AV AREA/BSA PEAK VELOCITY: 0.9 CM2/M2
ECHO AV AREA/BSA VTI: 0.8 CM2/M2
ECHO AV MEAN GRADIENT: 9 MMHG
ECHO AV MEAN VELOCITY: 1.4 M/S
ECHO AV PEAK GRADIENT: 16 MMHG
ECHO AV PEAK VELOCITY: 2 M/S
ECHO AV VELOCITY RATIO: 0.5
ECHO AV VTI: 46.3 CM
ECHO BSA: 1.75 M2
ECHO EST RA PRESSURE: 3 MMHG
ECHO IVC PROX: 1.8 CM
ECHO LA AREA 4C: 22.2 CM2
ECHO LA DIAMETER INDEX: 2.36 CM/M2
ECHO LA DIAMETER: 4.1 CM
ECHO LA MAJOR AXIS: 6.6 CM
ECHO LA TO AORTIC ROOT RATIO: 1.64
ECHO LA VOL MOD A4C: 61 ML (ref 22–52)
ECHO LA VOLUME INDEX MOD A4C: 35 ML/M2 (ref 16–34)
ECHO LV E' LATERAL VELOCITY: 9 CM/S
ECHO LV EDV A4C: 49 ML
ECHO LV EDV INDEX A4C: 28 ML/M2
ECHO LV EF PHYSICIAN: 60 %
ECHO LV EJECTION FRACTION A4C: 65 %
ECHO LV ESV A4C: 17 ML
ECHO LV ESV INDEX A4C: 10 ML/M2
ECHO LV FRACTIONAL SHORTENING: 33 % (ref 28–44)
ECHO LV INTERNAL DIMENSION DIASTOLE INDEX: 2.47 CM/M2
ECHO LV INTERNAL DIMENSION DIASTOLIC: 4.3 CM (ref 3.9–5.3)
ECHO LV INTERNAL DIMENSION SYSTOLIC INDEX: 1.67 CM/M2
ECHO LV INTERNAL DIMENSION SYSTOLIC: 2.9 CM
ECHO LV IVSD: 1.3 CM (ref 0.6–0.9)
ECHO LV MASS 2D: 207.8 G (ref 67–162)
ECHO LV MASS INDEX 2D: 119.4 G/M2 (ref 43–95)
ECHO LV POSTERIOR WALL DIASTOLIC: 1.3 CM (ref 0.6–0.9)
ECHO LV RELATIVE WALL THICKNESS RATIO: 0.6
ECHO LVOT AREA: 3.1 CM2
ECHO LVOT AV VTI INDEX: 0.46
ECHO LVOT DIAM: 2 CM
ECHO LVOT MEAN GRADIENT: 2 MMHG
ECHO LVOT PEAK GRADIENT: 4 MMHG
ECHO LVOT PEAK VELOCITY: 1 M/S
ECHO LVOT STROKE VOLUME INDEX: 38.1 ML/M2
ECHO LVOT SV: 66.3 ML
ECHO LVOT VTI: 21.1 CM
ECHO RIGHT VENTRICULAR SYSTOLIC PRESSURE (RVSP): 39 MMHG
ECHO RV MID DIMENSION: 3.6 CM
ECHO TV REGURGITANT MAX VELOCITY: 2.98 M/S
ECHO TV REGURGITANT PEAK GRADIENT: 36 MMHG
EOSINOPHIL # BLD: 0.14 K/UL
EOSINOPHILS RELATIVE PERCENT: 2 % (ref 0–3)
ERYTHROCYTE [DISTWIDTH] IN BLOOD BY AUTOMATED COUNT: 16.3 % (ref 11.7–14.9)
GFR, ESTIMATED: 66 ML/MIN/1.73M2
GLUCOSE BLD-MCNC: 166 MG/DL (ref 74–99)
GLUCOSE SERPL-MCNC: 108 MG/DL (ref 74–99)
HCO3 VENOUS: 20.9 MMOL/L (ref 22–29)
HCO3 VENOUS: 24.6 MMOL/L (ref 22–29)
HCT VFR BLD AUTO: 36 % (ref 37–47)
HGB BLD-MCNC: 11.5 G/DL (ref 12.5–16)
IMM GRANULOCYTES # BLD AUTO: 0.02 K/UL
IMM GRANULOCYTES NFR BLD: 0 %
INR PPP: 1.7
LYMPHOCYTES NFR BLD: 1.15 K/UL
LYMPHOCYTES RELATIVE PERCENT: 14 % (ref 24–44)
MAGNESIUM SERPL-MCNC: 1.9 MG/DL (ref 1.8–2.4)
MCH RBC QN AUTO: 28.2 PG (ref 27–31)
MCHC RBC AUTO-ENTMCNC: 31.9 G/DL (ref 32–36)
MCV RBC AUTO: 88.2 FL (ref 78–100)
METHEMOGLOBIN: 0.4 % (ref 0.5–1.5)
METHEMOGLOBIN: 0.5 % (ref 0.5–1.5)
MONOCYTES NFR BLD: 0.86 K/UL
MONOCYTES NFR BLD: 10 % (ref 0–4)
NEGATIVE BASE EXCESS, VEN: 4.9 MMOL/L (ref 0–3)
NEUTROPHILS NFR BLD: 74 % (ref 36–66)
NEUTS SEG NFR BLD: 6.27 K/UL
OXYHGB MFR BLD: 88.5 %
OXYHGB MFR BLD: 88.6 %
PCO2 VENOUS: 37.6 MM HG (ref 38–54)
PCO2 VENOUS: 41.4 MM HG (ref 38–54)
PH VENOUS: 7.32 (ref 7.32–7.43)
PH VENOUS: 7.43 (ref 7.32–7.43)
PLATELET # BLD AUTO: 195 K/UL (ref 140–440)
PMV BLD AUTO: 10.7 FL (ref 7.5–11.1)
PO2 VENOUS: 56 MM HG (ref 23–48)
PO2 VENOUS: 64.6 MM HG (ref 23–48)
POSITIVE BASE EXCESS, VEN: 0.6 MMOL/L (ref 0–3)
POTASSIUM SERPL-SCNC: 3.3 MMOL/L (ref 3.5–5.1)
PROT SERPL-MCNC: 5.9 G/DL (ref 6.4–8.2)
PROTHROMBIN TIME: 19.7 SEC (ref 11.7–14.5)
RBC # BLD AUTO: 4.08 M/UL (ref 4.2–5.4)
SODIUM SERPL-SCNC: 137 MMOL/L (ref 136–145)
T3FREE SERPL-MCNC: 2.05 PG/ML (ref 2.3–4.2)
TROPONIN I SERPL HS-MCNC: 26 NG/L (ref 0–14)
TSH SERPL DL<=0.05 MIU/L-ACNC: 5.45 UIU/ML (ref 0.27–4.2)
WBC OTHER # BLD: 8.5 K/UL (ref 4–10.5)

## 2024-09-09 PROCEDURE — 6370000000 HC RX 637 (ALT 250 FOR IP)

## 2024-09-09 PROCEDURE — 85610 PROTHROMBIN TIME: CPT

## 2024-09-09 PROCEDURE — 93306 TTE W/DOPPLER COMPLETE: CPT | Performed by: INTERNAL MEDICINE

## 2024-09-09 PROCEDURE — 2580000003 HC RX 258: Performed by: STUDENT IN AN ORGANIZED HEALTH CARE EDUCATION/TRAINING PROGRAM

## 2024-09-09 PROCEDURE — 84481 FREE ASSAY (FT-3): CPT

## 2024-09-09 PROCEDURE — 84443 ASSAY THYROID STIM HORMONE: CPT

## 2024-09-09 PROCEDURE — 85025 COMPLETE CBC W/AUTO DIFF WBC: CPT

## 2024-09-09 PROCEDURE — 6370000000 HC RX 637 (ALT 250 FOR IP): Performed by: INTERNAL MEDICINE

## 2024-09-09 PROCEDURE — 83735 ASSAY OF MAGNESIUM: CPT

## 2024-09-09 PROCEDURE — 86376 MICROSOMAL ANTIBODY EACH: CPT

## 2024-09-09 PROCEDURE — 86800 THYROGLOBULIN ANTIBODY: CPT

## 2024-09-09 PROCEDURE — 99233 SBSQ HOSP IP/OBS HIGH 50: CPT | Performed by: INTERNAL MEDICINE

## 2024-09-09 PROCEDURE — 84484 ASSAY OF TROPONIN QUANT: CPT

## 2024-09-09 PROCEDURE — 6370000000 HC RX 637 (ALT 250 FOR IP): Performed by: STUDENT IN AN ORGANIZED HEALTH CARE EDUCATION/TRAINING PROGRAM

## 2024-09-09 PROCEDURE — 6360000002 HC RX W HCPCS: Performed by: STUDENT IN AN ORGANIZED HEALTH CARE EDUCATION/TRAINING PROGRAM

## 2024-09-09 PROCEDURE — 93306 TTE W/DOPPLER COMPLETE: CPT

## 2024-09-09 PROCEDURE — 94618 PULMONARY STRESS TESTING: CPT

## 2024-09-09 PROCEDURE — 94761 N-INVAS EAR/PLS OXIMETRY MLT: CPT

## 2024-09-09 PROCEDURE — 36415 COLL VENOUS BLD VENIPUNCTURE: CPT

## 2024-09-09 PROCEDURE — 80053 COMPREHEN METABOLIC PANEL: CPT

## 2024-09-09 PROCEDURE — 2060000000 HC ICU INTERMEDIATE R&B

## 2024-09-09 PROCEDURE — 82962 GLUCOSE BLOOD TEST: CPT

## 2024-09-09 PROCEDURE — 83880 ASSAY OF NATRIURETIC PEPTIDE: CPT

## 2024-09-09 RX ORDER — POTASSIUM CHLORIDE 7.45 MG/ML
10 INJECTION INTRAVENOUS PRN
Status: DISCONTINUED | OUTPATIENT
Start: 2024-09-09 | End: 2024-09-10 | Stop reason: HOSPADM

## 2024-09-09 RX ORDER — FUROSEMIDE 20 MG
20 TABLET ORAL DAILY
Status: DISCONTINUED | OUTPATIENT
Start: 2024-09-09 | End: 2024-09-09

## 2024-09-09 RX ORDER — FUROSEMIDE 40 MG
40 TABLET ORAL DAILY
Status: DISCONTINUED | OUTPATIENT
Start: 2024-09-10 | End: 2024-09-10 | Stop reason: HOSPADM

## 2024-09-09 RX ORDER — MAGNESIUM SULFATE IN WATER 40 MG/ML
2000 INJECTION, SOLUTION INTRAVENOUS PRN
Status: DISCONTINUED | OUTPATIENT
Start: 2024-09-09 | End: 2024-09-10 | Stop reason: HOSPADM

## 2024-09-09 RX ORDER — POTASSIUM CHLORIDE 1500 MG/1
40 TABLET, EXTENDED RELEASE ORAL PRN
Status: DISCONTINUED | OUTPATIENT
Start: 2024-09-09 | End: 2024-09-10 | Stop reason: HOSPADM

## 2024-09-09 RX ADMIN — SODIUM CHLORIDE, PRESERVATIVE FREE 10 ML: 5 INJECTION INTRAVENOUS at 22:14

## 2024-09-09 RX ADMIN — APIXABAN 5 MG: 2.5 TABLET, FILM COATED ORAL at 22:13

## 2024-09-09 RX ADMIN — FUROSEMIDE 40 MG: 10 INJECTION, SOLUTION INTRAMUSCULAR; INTRAVENOUS at 08:34

## 2024-09-09 RX ADMIN — ACETAMINOPHEN 650 MG: 325 TABLET ORAL at 14:42

## 2024-09-09 RX ADMIN — METOPROLOL SUCCINATE 50 MG: 50 TABLET, EXTENDED RELEASE ORAL at 08:34

## 2024-09-09 RX ADMIN — APIXABAN 5 MG: 2.5 TABLET, FILM COATED ORAL at 08:33

## 2024-09-09 RX ADMIN — POTASSIUM CHLORIDE 40 MEQ: 1500 TABLET, EXTENDED RELEASE ORAL at 08:33

## 2024-09-09 RX ADMIN — AMLODIPINE BESYLATE 5 MG: 5 TABLET ORAL at 08:33

## 2024-09-09 RX ADMIN — SODIUM CHLORIDE, PRESERVATIVE FREE 10 ML: 5 INJECTION INTRAVENOUS at 08:34

## 2024-09-09 ASSESSMENT — PAIN DESCRIPTION - LOCATION: LOCATION: HEAD

## 2024-09-09 ASSESSMENT — ENCOUNTER SYMPTOMS
CONSTIPATION: 0
SHORTNESS OF BREATH: 0
SORE THROAT: 0
ABDOMINAL DISTENTION: 0
WHEEZING: 0
EYE DISCHARGE: 0
BACK PAIN: 0
DIARRHEA: 0
COUGH: 0

## 2024-09-09 ASSESSMENT — PAIN SCALES - GENERAL
PAINLEVEL_OUTOF10: 8
PAINLEVEL_OUTOF10: 6
PAINLEVEL_OUTOF10: 0
PAINLEVEL_OUTOF10: 0

## 2024-09-09 ASSESSMENT — PAIN SCALES - WONG BAKER: WONGBAKER_NUMERICALRESPONSE: NO HURT

## 2024-09-09 ASSESSMENT — PAIN DESCRIPTION - ORIENTATION: ORIENTATION: MID

## 2024-09-09 ASSESSMENT — PAIN DESCRIPTION - DESCRIPTORS: DESCRIPTORS: ACHING

## 2024-09-10 VITALS
WEIGHT: 155.2 LBS | RESPIRATION RATE: 19 BRPM | TEMPERATURE: 97.7 F | DIASTOLIC BLOOD PRESSURE: 59 MMHG | HEART RATE: 65 BPM | BODY MASS INDEX: 25.86 KG/M2 | HEIGHT: 65 IN | SYSTOLIC BLOOD PRESSURE: 142 MMHG | OXYGEN SATURATION: 95 %

## 2024-09-10 LAB
ALBUMIN SERPL-MCNC: 3.9 G/DL (ref 3.4–5)
ALBUMIN/GLOB SERPL: 1.9 {RATIO} (ref 1.1–2.2)
ALP SERPL-CCNC: 114 U/L (ref 40–129)
ALT SERPL-CCNC: 16 U/L (ref 10–40)
ANION GAP SERPL CALCULATED.3IONS-SCNC: 12 MMOL/L (ref 9–17)
AST SERPL-CCNC: 24 U/L (ref 15–37)
BASOPHILS # BLD: 0.04 K/UL
BASOPHILS NFR BLD: 1 % (ref 0–1)
BILIRUB SERPL-MCNC: 1.4 MG/DL (ref 0–1)
BUN SERPL-MCNC: 17 MG/DL (ref 7–20)
CALCIUM SERPL-MCNC: 8.8 MG/DL (ref 8.3–10.6)
CHLORIDE SERPL-SCNC: 94 MMOL/L (ref 99–110)
CO2 SERPL-SCNC: 29 MMOL/L (ref 21–32)
CREAT SERPL-MCNC: 0.8 MG/DL (ref 0.6–1.2)
EKG ATRIAL RATE: 46 BPM
EKG ATRIAL RATE: 71 BPM
EKG DIAGNOSIS: NORMAL
EKG DIAGNOSIS: NORMAL
EKG Q-T INTERVAL: 294 MS
EKG Q-T INTERVAL: 436 MS
EKG QRS DURATION: 74 MS
EKG QRS DURATION: 82 MS
EKG QTC CALCULATION (BAZETT): 359 MS
EKG QTC CALCULATION (BAZETT): 453 MS
EKG R AXIS: 37 DEGREES
EKG R AXIS: 62 DEGREES
EKG T AXIS: 231 DEGREES
EKG T AXIS: 245 DEGREES
EKG VENTRICULAR RATE: 65 BPM
EKG VENTRICULAR RATE: 90 BPM
EOSINOPHIL # BLD: 0.2 K/UL
EOSINOPHILS RELATIVE PERCENT: 3 % (ref 0–3)
ERYTHROCYTE [DISTWIDTH] IN BLOOD BY AUTOMATED COUNT: 16.1 % (ref 11.7–14.9)
GFR, ESTIMATED: 67 ML/MIN/1.73M2
GLUCOSE SERPL-MCNC: 110 MG/DL (ref 74–99)
HCT VFR BLD AUTO: 38.6 % (ref 37–47)
HGB BLD-MCNC: 12.6 G/DL (ref 12.5–16)
IMM GRANULOCYTES # BLD AUTO: 0.01 K/UL
IMM GRANULOCYTES NFR BLD: 0 %
LYMPHOCYTES NFR BLD: 1.71 K/UL
LYMPHOCYTES RELATIVE PERCENT: 25 % (ref 24–44)
MAGNESIUM SERPL-MCNC: 1.9 MG/DL (ref 1.8–2.4)
MCH RBC QN AUTO: 28.4 PG (ref 27–31)
MCHC RBC AUTO-ENTMCNC: 32.6 G/DL (ref 32–36)
MCV RBC AUTO: 87.1 FL (ref 78–100)
MICROSOMAL ANTIBODY: 14 IU/ML
MONOCYTES NFR BLD: 0.78 K/UL
MONOCYTES NFR BLD: 11 % (ref 0–4)
NEUTROPHILS NFR BLD: 61 % (ref 36–66)
NEUTS SEG NFR BLD: 4.2 K/UL
PLATELET # BLD AUTO: 211 K/UL (ref 140–440)
PMV BLD AUTO: 10.6 FL (ref 7.5–11.1)
POTASSIUM SERPL-SCNC: 3.4 MMOL/L (ref 3.5–5.1)
PROT SERPL-MCNC: 5.9 G/DL (ref 6.4–8.2)
RBC # BLD AUTO: 4.43 M/UL (ref 4.2–5.4)
SODIUM SERPL-SCNC: 135 MMOL/L (ref 136–145)
WBC OTHER # BLD: 6.9 K/UL (ref 4–10.5)

## 2024-09-10 PROCEDURE — 97162 PT EVAL MOD COMPLEX 30 MIN: CPT

## 2024-09-10 PROCEDURE — 36415 COLL VENOUS BLD VENIPUNCTURE: CPT

## 2024-09-10 PROCEDURE — 94761 N-INVAS EAR/PLS OXIMETRY MLT: CPT

## 2024-09-10 PROCEDURE — 93005 ELECTROCARDIOGRAM TRACING: CPT

## 2024-09-10 PROCEDURE — 83735 ASSAY OF MAGNESIUM: CPT

## 2024-09-10 PROCEDURE — 6370000000 HC RX 637 (ALT 250 FOR IP): Performed by: INTERNAL MEDICINE

## 2024-09-10 PROCEDURE — 93010 ELECTROCARDIOGRAM REPORT: CPT | Performed by: INTERNAL MEDICINE

## 2024-09-10 PROCEDURE — 6370000000 HC RX 637 (ALT 250 FOR IP): Performed by: STUDENT IN AN ORGANIZED HEALTH CARE EDUCATION/TRAINING PROGRAM

## 2024-09-10 PROCEDURE — 6370000000 HC RX 637 (ALT 250 FOR IP)

## 2024-09-10 PROCEDURE — 80053 COMPREHEN METABOLIC PANEL: CPT

## 2024-09-10 PROCEDURE — 85025 COMPLETE CBC W/AUTO DIFF WBC: CPT

## 2024-09-10 RX ORDER — FUROSEMIDE 40 MG
40 TABLET ORAL DAILY
Qty: 60 TABLET | Refills: 3 | Status: SHIPPED | OUTPATIENT
Start: 2024-09-11

## 2024-09-10 RX ORDER — LEVOTHYROXINE SODIUM 25 UG/1
25 TABLET ORAL DAILY
Status: DISCONTINUED | OUTPATIENT
Start: 2024-09-10 | End: 2024-09-10 | Stop reason: HOSPADM

## 2024-09-10 RX ORDER — LEVOTHYROXINE SODIUM 25 UG/1
25 TABLET ORAL DAILY
Qty: 30 TABLET | Refills: 3 | Status: SHIPPED | OUTPATIENT
Start: 2024-09-11

## 2024-09-10 RX ADMIN — AMLODIPINE BESYLATE 5 MG: 5 TABLET ORAL at 09:20

## 2024-09-10 RX ADMIN — POTASSIUM CHLORIDE 40 MEQ: 1500 TABLET, EXTENDED RELEASE ORAL at 11:45

## 2024-09-10 RX ADMIN — LEVOTHYROXINE SODIUM 25 MCG: 25 TABLET ORAL at 09:23

## 2024-09-10 RX ADMIN — APIXABAN 5 MG: 2.5 TABLET, FILM COATED ORAL at 09:21

## 2024-09-10 RX ADMIN — FUROSEMIDE 40 MG: 40 TABLET ORAL at 09:19

## 2024-09-10 RX ADMIN — METOPROLOL SUCCINATE 50 MG: 50 TABLET, EXTENDED RELEASE ORAL at 09:20

## 2024-09-14 LAB
MICROORGANISM SPEC CULT: NORMAL
SERVICE CMNT-IMP: NORMAL
SPECIMEN DESCRIPTION: NORMAL

## 2024-09-25 ENCOUNTER — OFFICE VISIT (OUTPATIENT)
Dept: CARDIOLOGY CLINIC | Age: 89
End: 2024-09-25
Payer: MEDICARE

## 2024-09-25 ENCOUNTER — TELEPHONE (OUTPATIENT)
Dept: CARDIOLOGY CLINIC | Age: 89
End: 2024-09-25

## 2024-09-25 VITALS
OXYGEN SATURATION: 94 % | HEART RATE: 52 BPM | DIASTOLIC BLOOD PRESSURE: 64 MMHG | BODY MASS INDEX: 22.89 KG/M2 | WEIGHT: 137.4 LBS | SYSTOLIC BLOOD PRESSURE: 138 MMHG | HEIGHT: 65 IN

## 2024-09-25 DIAGNOSIS — I48.0 PAROXYSMAL ATRIAL FIBRILLATION (HCC): Primary | ICD-10-CM

## 2024-09-25 DIAGNOSIS — I63.89 CEREBROVASCULAR ACCIDENT (CVA) DUE TO OTHER MECHANISM (HCC): ICD-10-CM

## 2024-09-25 PROBLEM — I50.32 CHRONIC DIASTOLIC CONGESTIVE HEART FAILURE (HCC): Status: ACTIVE | Noted: 2024-09-08

## 2024-09-25 PROCEDURE — 99214 OFFICE O/P EST MOD 30 MIN: CPT | Performed by: NURSE PRACTITIONER

## 2024-09-25 PROCEDURE — 1036F TOBACCO NON-USER: CPT | Performed by: NURSE PRACTITIONER

## 2024-09-25 PROCEDURE — G8427 DOCREV CUR MEDS BY ELIG CLIN: HCPCS | Performed by: NURSE PRACTITIONER

## 2024-09-25 PROCEDURE — 1090F PRES/ABSN URINE INCON ASSESS: CPT | Performed by: NURSE PRACTITIONER

## 2024-09-25 PROCEDURE — 1111F DSCHRG MED/CURRENT MED MERGE: CPT | Performed by: NURSE PRACTITIONER

## 2024-09-25 PROCEDURE — 1123F ACP DISCUSS/DSCN MKR DOCD: CPT | Performed by: NURSE PRACTITIONER

## 2024-09-25 PROCEDURE — G8420 CALC BMI NORM PARAMETERS: HCPCS | Performed by: NURSE PRACTITIONER

## 2024-09-25 RX ORDER — POTASSIUM CHLORIDE 750 MG/1
10 CAPSULE, EXTENDED RELEASE ORAL DAILY
COMMUNITY

## 2024-09-25 RX ORDER — FUROSEMIDE 40 MG
20 TABLET ORAL DAILY
Qty: 60 TABLET | Refills: 3
Start: 2024-09-25

## 2024-09-25 ASSESSMENT — ENCOUNTER SYMPTOMS
COUGH: 0
SHORTNESS OF BREATH: 0

## 2024-10-03 ENCOUNTER — TELEPHONE (OUTPATIENT)
Dept: CARDIOLOGY CLINIC | Age: 89
End: 2024-10-03

## 2024-10-03 NOTE — TELEPHONE ENCOUNTER
----- Message from NADINE Styles CNP sent at 10/3/2024  1:14 PM EDT -----  Please let daughter know always in afib but rate is controlled. We do recommend to continue anticoagulation.   Will discuss further at next OV  ----- Message -----  From: Amada Souza MD  Sent: 10/2/2024   2:36 PM EDT  To: NADINE Camargo CNP

## 2024-10-15 ENCOUNTER — TELEPHONE (OUTPATIENT)
Dept: CARDIOLOGY CLINIC | Age: 88
End: 2024-10-15

## 2024-10-15 NOTE — TELEPHONE ENCOUNTER
Voicemail left for Joanne to call Christy Thompson from [facility] to discuss Freida Gupta. Did not give a reason in regards to what

## 2024-10-16 LAB
ALBUMIN: 4 G/DL
ALP BLD-CCNC: 121 U/L
ALT SERPL-CCNC: NORMAL U/L
ANION GAP SERPL CALCULATED.3IONS-SCNC: NORMAL MMOL/L
AST SERPL-CCNC: 22 U/L
BILIRUB SERPL-MCNC: 0.6 MG/DL (ref 0.1–1.4)
BUN BLDV-MCNC: 14 MG/DL
CALCIUM SERPL-MCNC: 9.3 MG/DL
CHLORIDE BLD-SCNC: 99 MMOL/L
CO2: 23 MMOL/L
CREAT SERPL-MCNC: 1 MG/DL
GFR, ESTIMATED: 53
GLUCOSE BLD-MCNC: 106 MG/DL
POTASSIUM SERPL-SCNC: NORMAL MMOL/L
SODIUM BLD-SCNC: 137 MMOL/L
TOTAL PROTEIN: 6.6 G/DL (ref 6.4–8.2)

## 2024-10-17 ENCOUNTER — OFFICE VISIT (OUTPATIENT)
Dept: CARDIOLOGY CLINIC | Age: 89
End: 2024-10-17
Payer: MEDICARE

## 2024-10-17 VITALS
HEIGHT: 65 IN | WEIGHT: 149 LBS | HEART RATE: 58 BPM | SYSTOLIC BLOOD PRESSURE: 142 MMHG | BODY MASS INDEX: 24.83 KG/M2 | OXYGEN SATURATION: 97 % | DIASTOLIC BLOOD PRESSURE: 70 MMHG

## 2024-10-17 DIAGNOSIS — I48.11 HYPERCOAGULABLE STATE DUE TO LONGSTANDING PERSISTENT ATRIAL FIBRILLATION (HCC): ICD-10-CM

## 2024-10-17 DIAGNOSIS — I48.0 PAROXYSMAL ATRIAL FIBRILLATION (HCC): ICD-10-CM

## 2024-10-17 DIAGNOSIS — I50.32 CHRONIC DIASTOLIC CONGESTIVE HEART FAILURE (HCC): Primary | ICD-10-CM

## 2024-10-17 DIAGNOSIS — D68.69 HYPERCOAGULABLE STATE DUE TO LONGSTANDING PERSISTENT ATRIAL FIBRILLATION (HCC): ICD-10-CM

## 2024-10-17 PROCEDURE — 1123F ACP DISCUSS/DSCN MKR DOCD: CPT | Performed by: NURSE PRACTITIONER

## 2024-10-17 PROCEDURE — G8427 DOCREV CUR MEDS BY ELIG CLIN: HCPCS | Performed by: NURSE PRACTITIONER

## 2024-10-17 PROCEDURE — 99214 OFFICE O/P EST MOD 30 MIN: CPT | Performed by: NURSE PRACTITIONER

## 2024-10-17 PROCEDURE — G8420 CALC BMI NORM PARAMETERS: HCPCS | Performed by: NURSE PRACTITIONER

## 2024-10-17 PROCEDURE — 1090F PRES/ABSN URINE INCON ASSESS: CPT | Performed by: NURSE PRACTITIONER

## 2024-10-17 PROCEDURE — G8484 FLU IMMUNIZE NO ADMIN: HCPCS | Performed by: NURSE PRACTITIONER

## 2024-10-17 PROCEDURE — 1036F TOBACCO NON-USER: CPT | Performed by: NURSE PRACTITIONER

## 2024-10-17 PROCEDURE — 1159F MED LIST DOCD IN RCRD: CPT | Performed by: NURSE PRACTITIONER

## 2024-10-17 RX ORDER — FUROSEMIDE 40 MG/1
20 TABLET ORAL EVERY OTHER DAY
Qty: 60 TABLET | Refills: 3
Start: 2024-10-17

## 2024-10-17 NOTE — PROGRESS NOTES
CARDIOLOGY  NOTE    10/17/2024    Freida Gupta (:  1931) is a 89 y.o. female,an established patient with Dr. Murray, here for evaluation of the following chief complaint(s):  Follow-up (Pt states no cardiac sx )        SUBJECTIVE/OBJECTIVE:    HPI  Freida is here to follow up on her cardiovascular health.     She states that she has been doing well.  Her daughter is with her today.  Patient does not provide much information.  Daughter is extremely concerned over cost of Eliquis    Freida has a history of CHF, DM, HTN, PAF and advanced age    She is a non smoker. She denies any issues with obtaining taking or side effects from medications.       Review of Systems   Constitutional:  Negative for fatigue and fever.   Respiratory:  Negative for cough and shortness of breath.    Cardiovascular:  Negative for chest pain, palpitations and leg swelling.   Musculoskeletal:  Negative for arthralgias and gait problem.   Neurological:  Negative for dizziness, syncope, weakness, light-headedness and headaches.       Vitals:    10/17/24 1259   BP: (!) 142/70   Site: Left Upper Arm   Position: Sitting   Cuff Size: Medium Adult   Pulse: 58   SpO2: 97%   Weight: 67.6 kg (149 lb)   Height: 1.651 m (5' 5\")       Wt Readings from Last 3 Encounters:   10/17/24 67.6 kg (149 lb)   24 62.3 kg (137 lb 6.4 oz)   09/10/24 70.4 kg (155 lb 3.3 oz)       BP Readings from Last 3 Encounters:   10/17/24 (!) 142/70   24 138/64   09/10/24 (!) 142/59       Prior to Admission medications    Medication Sig Start Date End Date Taking? Authorizing Provider   potassium chloride (MICRO-K) 10 MEQ extended release capsule Take 1 capsule by mouth daily   Yes ProviderHerb MD   furosemide (LASIX) 40 MG tablet Take 0.5 tablets by mouth daily 24  Yes Joanne Coppola, APRN - CNP   apixaban (ELIQUIS) 5 MG TABS tablet Take 1 tablet by mouth 2 times daily 9/10/24  Yes Trevon Ferrari MD   metoprolol succinate (TOPROL XL) 50

## 2024-10-30 LAB
MICROORGANISM SPEC CULT: NORMAL
SERVICE CMNT-IMP: NORMAL
SPECIMEN DESCRIPTION: NORMAL

## 2024-11-19 ENCOUNTER — OFFICE VISIT (OUTPATIENT)
Dept: CARDIOLOGY CLINIC | Age: 88
End: 2024-11-19

## 2024-11-19 VITALS
HEIGHT: 65 IN | OXYGEN SATURATION: 96 % | HEART RATE: 61 BPM | SYSTOLIC BLOOD PRESSURE: 136 MMHG | DIASTOLIC BLOOD PRESSURE: 64 MMHG | WEIGHT: 150 LBS | BODY MASS INDEX: 24.99 KG/M2

## 2024-11-19 DIAGNOSIS — I50.32 CHRONIC DIASTOLIC CONGESTIVE HEART FAILURE (HCC): Primary | ICD-10-CM

## 2024-11-19 DIAGNOSIS — I10 PRIMARY HYPERTENSION: ICD-10-CM

## 2024-11-19 DIAGNOSIS — I48.0 PAROXYSMAL ATRIAL FIBRILLATION (HCC): ICD-10-CM

## 2024-11-19 DIAGNOSIS — D68.69 HYPERCOAGULABLE STATE DUE TO LONGSTANDING PERSISTENT ATRIAL FIBRILLATION (HCC): ICD-10-CM

## 2024-11-19 DIAGNOSIS — I48.11 HYPERCOAGULABLE STATE DUE TO LONGSTANDING PERSISTENT ATRIAL FIBRILLATION (HCC): ICD-10-CM

## 2024-11-19 NOTE — PROGRESS NOTES
CARDIOLOGY  NOTE    2024    Freida Gupta (:  1931) is a 89 y.o. female,an established patient with Dr. Murray, here for evaluation of the following chief complaint(s):  Follow-up (Pt states no cardiac sx )        SUBJECTIVE/OBJECTIVE:    HPI  Freida is here to follow up on her cardiovascular health.     She states that she has been doing well.  Her daughter is with her today.  Patient does not provide much information. Daughter denies any issues since last OV. They are getting eliquis from PCP therefore will continue eliquis for now.     Freida has a history of CHF, DM, HTN, PAF and advanced age    She is a non smoker. She denies any issues with obtaining taking or side effects from medications.       Review of Systems   Constitutional:  Negative for fatigue and fever.   Respiratory:  Negative for cough and shortness of breath.    Cardiovascular:  Negative for chest pain, palpitations and leg swelling.   Musculoskeletal:  Negative for arthralgias and gait problem.   Neurological:  Negative for dizziness, syncope, weakness, light-headedness and headaches.       Vitals:    24 1544   BP: 136/64   Site: Left Upper Arm   Position: Sitting   Cuff Size: Medium Adult   Pulse: 61   SpO2: 96%   Weight: 68 kg (150 lb)   Height: 1.651 m (5' 5\")         Wt Readings from Last 3 Encounters:   24 68 kg (150 lb)   10/17/24 67.6 kg (149 lb)   24 62.3 kg (137 lb 6.4 oz)       BP Readings from Last 3 Encounters:   24 136/64   10/17/24 (!) 142/70   24 138/64       Prior to Admission medications    Medication Sig Start Date End Date Taking? Authorizing Provider   furosemide (LASIX) 40 MG tablet Take 0.5 tablets by mouth every other day 10/17/24   Joanne Coppola, APRN - CNP   potassium chloride (MICRO-K) 10 MEQ extended release capsule Take 1 capsule by mouth daily    Provider, MD Herb   apixaban (ELIQUIS) 5 MG TABS tablet Take 1 tablet by mouth 2 times daily 9/10/24   Vega